# Patient Record
Sex: FEMALE | Race: WHITE | Employment: FULL TIME | URBAN - METROPOLITAN AREA
[De-identification: names, ages, dates, MRNs, and addresses within clinical notes are randomized per-mention and may not be internally consistent; named-entity substitution may affect disease eponyms.]

---

## 2019-04-04 RX ORDER — IBUPROFEN 200 MG
200 TABLET ORAL
COMMUNITY

## 2019-04-04 NOTE — PERIOP NOTES
[facility] Ambulatory Surgery Unit Pre-operative Instructions Surgery/Procedure Date  4/9/19            Tentative Arrival Time 3131 1. On the day of your surgery/procedure, please report to the Ambulatory Surgery Unit Registration Desk and sign in at your designated time. The Ambulatory Surgery Unit is located in [facility] on the [facility] side of the [facility] across from the [address] [place] [address]. Please have all of your health insurance cards and a photo ID. 2. You must have someone with you to drive you home, as you should not drive a car for 24 hours following anesthesia. Please make arrangements for a responsible adult friend or family member to stay with you for at least the first 24 hours after your surgery. 3. Do not have anything to eat or drink (including water, gum, mints, coffee, juice) after 11:59 PM  4/8/19, Monday. This may not apply to medications prescribed by your physician. (Please note below the special instructions with medications to take the morning of surgery, if applicable.) 4. We recommend you do not drink any alcoholic beverages for 24 hours before and after your surgery. 5. Contact your surgeons office for instructions on the following medications: non-steroidal anti-inflammatory drugs (i.e. Advil, Aleve), vitamins, and supplements. (Some surgeons will want you to stop these medications prior to surgery and others may allow you to take them) **If you are currently taking Plavix, Coumadin, Aspirin and/or other blood-thinning agents, contact your surgeon for instructions. ** Your surgeon will partner with the physician prescribing these medications to determine if it is safe to stop or if you need to continue taking. Please do not stop taking these medications without instructions from your surgeon.  
 
6. In an effort to help prevent surgical site infection, we ask that you shower with an anti-bacterial soap (i.e. Dial/Safeguard, or the soap provided to you at your preadmission testing appointment) for 3 days prior to and on the morning of surgery, using a fresh towel after each shower. (Please begin this process with fresh bed linens.) Do not apply any lotions, powders, or deodorants after the shower on the day of your procedure. If applicable, please do not shave the operative site for 48 hours prior to surgery. 7. Wear comfortable clothes. Wear glasses instead of contacts. Do not bring any jewelry or money (other than copays or fees as instructed). Do not wear make-up, particularly mascara, the morning of your surgery. Do not wear nail polish, particularly if you are having foot /hand surgery. Wear your hair loose or down, no ponytails, buns, sharif pins or clips. All body piercings must be removed. 8. You should understand that if you do not follow these instructions your surgery may be cancelled. If your physical condition changes (i.e. fever, cold or flu) please contact your surgeon as soon as possible. 9. It is important that you be on time. If a situation occurs where you may be late, or if you have any questions or problems, please call [phone]. 
 
10. Your surgery time may be subject to change. You will receive a phone call the day prior to surgery to confirm your arrival time. 11. Pediatric patients: please bring a change of clothes, diapers, bottle/sippy cup, pacifier, etc. 
 
 
Special Instructions: Take all medications and inhalers, as prescribed, on the morning of surgery with a sip of water EXCEPT: None Insulin Dependent Diabetic patients: Take your diabetic medications as prescribed the day before surgery. Hold all diabetic medications the day of surgery. If you are scheduled to arrive for surgery after 8:00 AM, and your AM blood sugar is >200, please call Ambulatory Surgery. I understand a pre-operative phone call will be made to verify my surgery time. In the event that I am not available, I give permission for a message to be left on my answering service and/or with another person? Yes Pre-op instructions given to pt over the phone and she verbalized an understanding 
 
 ___________________      ___________________      ________________ 
(Signature of Patient)          (Witness)                   (Date and Time)

## 2019-04-04 NOTE — PERIOP NOTES
Late entry Called pt to do PAT phone call. Pt stated she could not get into her PCP until May 9. Told pt that Dr. Azeem Yen office will be notified for clarification and pt will be called back. Spoke to [facility] from Dr. Azeem Yen office for clarification. [facility] stated that Dr. Payne Notice may not feel comfortable proceeding with surgery until she has seen PCP. [facility] stated pt sees Dr. Azeem Yen tomorrow and MD may be able to clear pt for surgery but no guarantee. [address] to [facility] from Dr. Azeem Yen office who stated Dr. Payne Notice just called her to tell her that she will try to do a physical in the office tomorrow for pt's scheduled appointment. There will be a chance pt will have to see PCP before cataract surgery. [facility] will call pt and notify her.

## 2019-04-08 ENCOUNTER — ANESTHESIA EVENT (OUTPATIENT)
Dept: SURGERY | Age: 54
End: 2019-04-08
Payer: COMMERCIAL

## 2019-04-08 NOTE — PERIOP NOTES
Clarified with Dr. Colby Gonzalez office that patient will have cataract surgery tomorrow.  Pt will proceed with surgery per MD.

## 2019-04-09 ENCOUNTER — HOSPITAL ENCOUNTER (OUTPATIENT)
Age: 54
Setting detail: OUTPATIENT SURGERY
Discharge: HOME | End: 2019-04-09
Attending: OPHTHALMOLOGY | Admitting: OPHTHALMOLOGY
Payer: COMMERCIAL

## 2019-04-09 ENCOUNTER — ANESTHESIA (OUTPATIENT)
Dept: SURGERY | Age: 54
End: 2019-04-09
Payer: COMMERCIAL

## 2019-04-09 VITALS
TEMPERATURE: 97.6 F | RESPIRATION RATE: 20 BRPM | HEIGHT: 68 IN | BODY MASS INDEX: 18.94 KG/M2 | DIASTOLIC BLOOD PRESSURE: 95 MMHG | HEART RATE: 63 BPM | SYSTOLIC BLOOD PRESSURE: 119 MMHG | WEIGHT: 125 LBS | OXYGEN SATURATION: 94 %

## 2019-04-09 PROBLEM — H25.812 COMBINED FORM OF AGE-RELATED CATARACT, LEFT EYE: Status: ACTIVE | Noted: 2019-04-09

## 2019-04-09 PROBLEM — H25.812 COMBINED FORM OF AGE-RELATED CATARACT, LEFT EYE: Status: RESOLVED | Noted: 2019-04-09 | Resolved: 2019-04-09

## 2019-04-09 PROCEDURE — 77030029561: Performed by: OPHTHALMOLOGY

## 2019-04-09 PROCEDURE — 76210000040 HC AMBSU PH I REC FIRST 0.5 HR: Performed by: OPHTHALMOLOGY

## 2019-04-09 PROCEDURE — 76030000000 HC AMB SURG OR TIME 0.5 TO 1: Performed by: OPHTHALMOLOGY

## 2019-04-09 PROCEDURE — 76210000046 HC AMBSU PH II REC FIRST 0.5 HR: Performed by: OPHTHALMOLOGY

## 2019-04-09 PROCEDURE — 74011250636 HC RX REV CODE- 250/636

## 2019-04-09 PROCEDURE — V2632 POST CHMBR INTRAOCULAR LENS: HCPCS | Performed by: OPHTHALMOLOGY

## 2019-04-09 PROCEDURE — 74011000250 HC RX REV CODE- 250: Performed by: OPHTHALMOLOGY

## 2019-04-09 PROCEDURE — 77030018846 HC SOL IRR STRL H20 ICUM -A: Performed by: OPHTHALMOLOGY

## 2019-04-09 PROCEDURE — 74011000250 HC RX REV CODE- 250

## 2019-04-09 PROCEDURE — 76060000061 HC AMB SURG ANES 0.5 TO 1 HR: Performed by: OPHTHALMOLOGY

## 2019-04-09 PROCEDURE — 74011250636 HC RX REV CODE- 250/636: Performed by: OPHTHALMOLOGY

## 2019-04-09 DEVICE — LENS IO +260 DIOPT L13MM DIA6MM 0DEG HAPTIC ANG A CONSTANT: Type: IMPLANTABLE DEVICE | Site: EYE | Status: FUNCTIONAL

## 2019-04-09 RX ORDER — SODIUM CHLORIDE 9 MG/ML
INJECTION, SOLUTION INTRAVENOUS
Status: DISCONTINUED | OUTPATIENT
Start: 2019-04-09 | End: 2019-04-09 | Stop reason: HOSPADM

## 2019-04-09 RX ORDER — PHENYLEPHRINE HYDROCHLORIDE 25 MG/ML
SOLUTION/ DROPS OPHTHALMIC
Status: COMPLETED
Start: 2019-04-09 | End: 2019-04-09

## 2019-04-09 RX ORDER — PROPARACAINE HYDROCHLORIDE 5 MG/ML
1 SOLUTION/ DROPS OPHTHALMIC
Status: DISCONTINUED | OUTPATIENT
Start: 2019-04-09 | End: 2019-04-09 | Stop reason: HOSPADM

## 2019-04-09 RX ORDER — LIDOCAINE HYDROCHLORIDE 10 MG/ML
0.1 INJECTION, SOLUTION EPIDURAL; INFILTRATION; INTRACAUDAL; PERINEURAL AS NEEDED
Status: DISCONTINUED | OUTPATIENT
Start: 2019-04-09 | End: 2019-04-09 | Stop reason: HOSPADM

## 2019-04-09 RX ORDER — SODIUM CHLORIDE 0.9 % (FLUSH) 0.9 %
5-40 SYRINGE (ML) INJECTION AS NEEDED
Status: DISCONTINUED | OUTPATIENT
Start: 2019-04-09 | End: 2019-04-09 | Stop reason: HOSPADM

## 2019-04-09 RX ORDER — SODIUM CHLORIDE, SODIUM LACTATE, POTASSIUM CHLORIDE, CALCIUM CHLORIDE 600; 310; 30; 20 MG/100ML; MG/100ML; MG/100ML; MG/100ML
25 INJECTION, SOLUTION INTRAVENOUS CONTINUOUS
Status: DISCONTINUED | OUTPATIENT
Start: 2019-04-09 | End: 2019-04-09 | Stop reason: HOSPADM

## 2019-04-09 RX ORDER — SODIUM CHLORIDE 0.9 % (FLUSH) 0.9 %
5-40 SYRINGE (ML) INJECTION EVERY 8 HOURS
Status: DISCONTINUED | OUTPATIENT
Start: 2019-04-09 | End: 2019-04-09 | Stop reason: HOSPADM

## 2019-04-09 RX ORDER — FENTANYL CITRATE 50 UG/ML
25 INJECTION, SOLUTION INTRAMUSCULAR; INTRAVENOUS
Status: DISCONTINUED | OUTPATIENT
Start: 2019-04-09 | End: 2019-04-09 | Stop reason: HOSPADM

## 2019-04-09 RX ORDER — PREDNISOLONE ACETATE 10 MG/ML
SUSPENSION/ DROPS OPHTHALMIC
Status: DISCONTINUED
Start: 2019-04-09 | End: 2019-04-09 | Stop reason: HOSPADM

## 2019-04-09 RX ORDER — SODIUM CHLORIDE 9 MG/ML
25 INJECTION, SOLUTION INTRAVENOUS ONCE
Status: COMPLETED | OUTPATIENT
Start: 2019-04-09 | End: 2019-04-09

## 2019-04-09 RX ORDER — PHENYLEPHRINE HYDROCHLORIDE 25 MG/ML
1 SOLUTION/ DROPS OPHTHALMIC
Status: COMPLETED | OUTPATIENT
Start: 2019-04-09 | End: 2019-04-09

## 2019-04-09 RX ORDER — MIDAZOLAM HYDROCHLORIDE 1 MG/ML
INJECTION, SOLUTION INTRAMUSCULAR; INTRAVENOUS AS NEEDED
Status: DISCONTINUED | OUTPATIENT
Start: 2019-04-09 | End: 2019-04-09 | Stop reason: HOSPADM

## 2019-04-09 RX ORDER — CYCLOPENTOLATE HYDROCHLORIDE 20 MG/ML
1 SOLUTION/ DROPS OPHTHALMIC
Status: COMPLETED | OUTPATIENT
Start: 2019-04-09 | End: 2019-04-09

## 2019-04-09 RX ORDER — LIDOCAINE HYDROCHLORIDE 40 MG/ML
INJECTION, SOLUTION RETROBULBAR; TOPICAL AS NEEDED
Status: DISCONTINUED | OUTPATIENT
Start: 2019-04-09 | End: 2019-04-09 | Stop reason: HOSPADM

## 2019-04-09 RX ORDER — ONDANSETRON 2 MG/ML
4 INJECTION INTRAMUSCULAR; INTRAVENOUS AS NEEDED
Status: DISCONTINUED | OUTPATIENT
Start: 2019-04-09 | End: 2019-04-09 | Stop reason: HOSPADM

## 2019-04-09 RX ORDER — DIPHENHYDRAMINE HYDROCHLORIDE 50 MG/ML
12.5 INJECTION, SOLUTION INTRAMUSCULAR; INTRAVENOUS AS NEEDED
Status: DISCONTINUED | OUTPATIENT
Start: 2019-04-09 | End: 2019-04-09 | Stop reason: HOSPADM

## 2019-04-09 RX ORDER — LIDOCAINE HYDROCHLORIDE 10 MG/ML
INJECTION, SOLUTION EPIDURAL; INFILTRATION; INTRACAUDAL; PERINEURAL AS NEEDED
Status: DISCONTINUED | OUTPATIENT
Start: 2019-04-09 | End: 2019-04-09 | Stop reason: HOSPADM

## 2019-04-09 RX ORDER — TROPICAMIDE 10 MG/ML
1 SOLUTION/ DROPS OPHTHALMIC
Status: COMPLETED | OUTPATIENT
Start: 2019-04-09 | End: 2019-04-09

## 2019-04-09 RX ORDER — PREDNISOLONE ACETATE 10 MG/ML
SUSPENSION/ DROPS OPHTHALMIC AS NEEDED
Status: DISCONTINUED | OUTPATIENT
Start: 2019-04-09 | End: 2019-04-09 | Stop reason: HOSPADM

## 2019-04-09 RX ADMIN — PHENYLEPHRINE HYDROCHLORIDE 1 DROP: 25 SOLUTION/ DROPS OPHTHALMIC at 07:31

## 2019-04-09 RX ADMIN — MIDAZOLAM HYDROCHLORIDE 0.5 MG: 1 INJECTION, SOLUTION INTRAMUSCULAR; INTRAVENOUS at 08:25

## 2019-04-09 RX ADMIN — SODIUM CHLORIDE: 9 INJECTION, SOLUTION INTRAVENOUS at 07:08

## 2019-04-09 RX ADMIN — TROPICAMIDE 1 DROP: 10 SOLUTION/ DROPS OPHTHALMIC at 07:24

## 2019-04-09 RX ADMIN — MIDAZOLAM HYDROCHLORIDE 0.5 MG: 1 INJECTION, SOLUTION INTRAMUSCULAR; INTRAVENOUS at 08:05

## 2019-04-09 RX ADMIN — CYCLOPENTOLATE HYDROCHLORIDE 1 DROP: 20 SOLUTION/ DROPS OPHTHALMIC at 07:18

## 2019-04-09 RX ADMIN — PHENYLEPHRINE HYDROCHLORIDE 1 DROP: 25 SOLUTION/ DROPS OPHTHALMIC at 07:19

## 2019-04-09 RX ADMIN — PHENYLEPHRINE HYDROCHLORIDE 1 DROP: 25 SOLUTION/ DROPS OPHTHALMIC at 07:24

## 2019-04-09 RX ADMIN — SODIUM CHLORIDE 25 ML/HR: 900 INJECTION, SOLUTION INTRAVENOUS at 07:08

## 2019-04-09 RX ADMIN — PHENYLEPHRINE HYDROCHLORIDE 1 DROP: 25 SOLUTION/ DROPS OPHTHALMIC at 07:14

## 2019-04-09 RX ADMIN — CYCLOPENTOLATE HYDROCHLORIDE 1 DROP: 20 SOLUTION/ DROPS OPHTHALMIC at 07:14

## 2019-04-09 RX ADMIN — TROPICAMIDE 1 DROP: 10 SOLUTION/ DROPS OPHTHALMIC at 07:31

## 2019-04-09 RX ADMIN — MIDAZOLAM HYDROCHLORIDE 1 MG: 1 INJECTION, SOLUTION INTRAMUSCULAR; INTRAVENOUS at 07:57

## 2019-04-09 RX ADMIN — CYCLOPENTOLATE HYDROCHLORIDE 1 DROP: 20 SOLUTION/ DROPS OPHTHALMIC at 07:24

## 2019-04-09 RX ADMIN — TROPICAMIDE 1 DROP: 10 SOLUTION/ DROPS OPHTHALMIC at 07:19

## 2019-04-09 RX ADMIN — TROPICAMIDE 1 DROP: 10 SOLUTION/ DROPS OPHTHALMIC at 07:14

## 2019-04-09 RX ADMIN — PROPARACAINE HYDROCHLORIDE 1 DROP: 5 SOLUTION/ DROPS OPHTHALMIC at 07:14

## 2019-04-09 RX ADMIN — CYCLOPENTOLATE HYDROCHLORIDE 1 DROP: 20 SOLUTION/ DROPS OPHTHALMIC at 07:31

## 2019-04-09 NOTE — PERIOP NOTES
Permission received to review discharge instructions and discuss private health information with Ingrid Taylor, .

## 2019-04-09 NOTE — DISCHARGE INSTRUCTIONS
CATARACT POST-OP INSTRUCTIONS    DAY OF SURGERY:  Remove patch  2 hours after you get home & start drops      1. Antibiotic - TAN TOP (Ofloxacin)                  1 drop in operated eye every 2 hours while awake                    2. Steroid - PINK OR WHITE TOP (Prednisolone acetate)                  SHAKE WELL BEFORE USING (10-15X)                  1 drop in operated eye every 2 hours while awake.                                                                        ________________________________________________________________________    STARTING ON THE DAY AFTER SURGERY:     1. Antibiotic - TAN TOP (Ofloxacin)                 1 drop in operated eye 4x per day for 1 week then STOP                   2. Steroid - PINK OR WHITE TOP (Prednisolone acetate)                  SHAKE WELL BEFORE USING (10-15X)                  1 drop in operated eye every 2 hours while awake x  1  week, then                  1 drop in operated eye 4x per day x  1  week then,                  1 drop in operated eye 3x per day x  1  week then,                  1 drop in operated eye 2x per day x  1  week then,                  1 drop in operated eye 1x per day x  1  week then,                                                              SPACE OUT ALL EYE DROPS BY 5 MINUTES SO YOU DON'T [address] OUT                ONE MEDICATION WITH THE OTHER ONE.      EYE CARE:    1.  DO NOT RUB YOUR EYE!  2.  Wear eye shield when sleeping for 1st week after surgery  3. No heavy lifting (over 15 pounds) or bending below the waist or straining (hard coughing/sneezing/bowel movements - take stool softener if needed)  for 5 days after surgery. 4.  Do not get dirty water in your surgery eye. You can shower if you cover and close your surgery eye.  5.  No makeup for 5 days after surgery on or around the surgery eye. No gardening or swimming for 3 weeks. Wear safety glasses whenever dirt may get in your eyes.   6.  Use all other eye medications you were on before surgery unless instructed by your eye doctor not to do so.  7.  Wash your hands before touching your eye or your eye drops. Do not contaminate the tips of the bottles. 8.  Call immediately for severe pain, worse redness, decreased vision, new flashing lights or floaters. A scratchy sensation is normal and should get better as your eye heals. 9.  Please call with any questions about your postop care. You can reach Dr. Jag Haas at phone number:  [phone] (office) or [phone] (cell) in an emergency. DO NOT TAKE SLEEPING MEDICATIONS OR ANTIANXIETY MEDICATIONS WHILE TAKING NARCOTIC PAIN MEDICATIONS,  ESPECIALLY THE NIGHT OF ANESTHESIA. CPAP PATIENTS BE SURE TO WEAR MACHINE WHENEVER NAPPING OR SLEEPING. DISCHARGE SUMMARY from Nurse    The following personal items collected during your admission are returned to you:   Dental Appliance: Dental Appliances: None  Vision: Visual Aid: Glasses, At home  Hearing Aid:    Jewelry: Jewelry: None  Clothing: Clothing: With patient  Other Valuables: Other Valuables: None  Valuables sent to safe:        PATIENT INSTRUCTIONS:    After general anesthesia or intravenous sedation, for 24 hours or while taking prescription Narcotics:  · Someone should be with you for the next 24 hours. ·  For your own safety, a responsible adult must drive you home. · Limit your activities  · Recommended activity: Rest today, up with assistance today. Do not climb stairs or shower unattended for the next 24 hours. · Please start with a soft bland diet and advance as tolerated (no nausea) to regular diet. · Do not drive and operate hazardous machinery  · Do not make important personal or business decisions  · Do  not drink alcoholic beverages  · If you have not urinated within 8 hours after discharge, please contact your surgeon on call.     Report the following to your surgeon:  · Excessive pain, swelling, redness or odor of or around the surgical area  · Temperature over 100.5  · Nausea and vomiting lasting longer than 4 hours or if unable to take medications  · Any signs of decreased circulation or nerve impairment to extremity: change in color, persistent  numbness, tingling, coldness or increase pain  ·   ·   · You will receive a Post Operative Call from one of the Recovery Room Nurses on the day after your surgery to check on you. It is very important for us to know how you are recovering after your surgery. If you have an issue or need to speak with someone, please call your surgeon, do not wait for the post operative call. · You may receive an e-mail or letter in the mail from [employer] regarding your experience with us in the Ambulatory Surgery Unit. Your feedback is valuable to us and we appreciate your participation in the survey. ·   .  · If the above instructions are not adequate or you are having problems after your surgery, call the physician at his office number. ·   · We wish youre a speedy recovery ? What to do at Home:      *  Please give a list of your current medications to your Primary Care Provider. *  Please update this list whenever your medications are discontinued, doses are      changed, or new medications (including over-the-counter products) are added. *  Please carry medication information at all times in case of emergency situations. These are general instructions for a healthy lifestyle:    No smoking/ No tobacco products/ Avoid exposure to second hand smoke    Surgeon General's Warning:  Quitting smoking now greatly reduces serious risk to your health.     Obesity, smoking, and sedentary lifestyle greatly increases your risk for illness    A healthy diet, regular physical exercise & weight monitoring are important for maintaining a healthy lifestyle    You may be retaining fluid if you have a history of heart failure or if you experience any of the following symptoms:  Weight gain of 3 pounds or more overnight or 5 pounds in a week, increased swelling in our hands or feet or shortness of breath while lying flat in bed. Please call your doctor as soon as you notice any of these symptoms; do not wait until your next office visit. Recognize signs and symptoms of STROKE:    B - Balance  E - Eyes    F-face looks uneven    A-arms unable to move or move even    S-speech slurred or non-existent    T-time-call 911 as soon as signs and symptoms begin-DO NOT go       Back to bed or wait to see if you get better-TIME IS BRAIN. If you have not received your influenza and/or pneumococcal vaccine, please follow up with your primary care physician. The discharge information has been reviewed with the patient and caregiver. The patient and caregiver verbalized understanding. TO PREVENT AN INFECTION      1. [address] YOUR HANDS     To prevent infection, good handwashing is the most important thing you or your caregiver can do.  Wash your hands with soap and water or use the hand  we gave you before you touch any wounds. 2.  USE CLEAN SHEETS     Use freshly cleaned sheets on your bed after surgery.  To keep the surgery site clean, do not allow pets to sleep with you while your wound is still healing. 3. STOP SMOKING    Stop smoking, or at least cut back on smoking     Smoking slows your healing. 4.  CONTROL YOUR BLOOD SUGAR     High blood sugars slow wound healing.  If you are diabetic, control your blood sugar levels before and after your surgery.

## 2019-04-09 NOTE — ANESTHESIA PREPROCEDURE EVALUATION
Relevant Problems No relevant active problems Anesthetic History No history of anesthetic complications Review of Systems / Medical History Patient summary reviewed, nursing notes reviewed and pertinent labs reviewed Pulmonary Smoker (former, quit 07/18) Neuro/Psych Within defined limits Cardiovascular Within defined limits Exercise tolerance: >4 METS 
  
GI/Hepatic/Renal 
Within defined limits Endo/Other Within defined limits Other Findings Physical Exam 
 
Airway Mallampati: III 
TM Distance: < 4 cm Neck ROM: normal range of motion Mouth opening: Normal 
 
 Cardiovascular Rhythm: regular Rate: normal 
 
 
Pertinent negatives: No murmur Dental 
No notable dental hx Pulmonary Breath sounds clear to auscultation Abdominal 
GI exam deferred Other Findings Anesthetic Plan ASA: 2 Anesthesia type: MAC Induction: Intravenous Anesthetic plan and risks discussed with: Patient

## 2019-04-09 NOTE — ANESTHESIA POSTPROCEDURE EVALUATION
Procedure(s): CATARACT EXTRACTION WITH INTRA OCULAR LENS IMPLANT, LEFT EYE. MAC Anesthesia Post Evaluation Multimodal analgesia: multimodal analgesia not used between 6 hours prior to anesthesia start to PACU discharge Patient location during evaluation: bedside Patient participation: complete - patient participated Level of consciousness: awake and alert Pain score: 0 Airway patency: patent Anesthetic complications: no 
Cardiovascular status: acceptable Respiratory status: acceptable Hydration status: acceptable Post anesthesia nausea and vomiting:  none Vitals Value Taken Time /73 4/9/2019  8:39 AM  
Temp 36.4 °C (97.6 °F) 4/9/2019  8:39 AM  
Pulse 66 4/9/2019  8:39 AM  
Resp 24 4/9/2019  8:39 AM  
SpO2 95 % 4/9/2019  8:39 AM

## 2019-04-09 NOTE — OP NOTES
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 Postop Diagnosis: same     Procedure/Surgery: Phacoemulsification with intraocular lens placement Left eye. Surgeon(s) and Role:     * Deepali Flood MD - Primary    Anesthesia: MAC     Blood Loss: None    Complications: None    Speciman Removed: * No specimens in log *     PHACO TIME:  00:35.8 seconds at an average power of 7.8%. CDE 3.47    Implants:   Implant Name Type Inv. Item Serial No.  Lot No. LRB No. Used Action   LENS IOL POST 1-PC 6X13 26.0 -- [employer] - [id number]  LENS IOL POST 1-PC 6X13 26.0 -- [employer] [id number] [employer] NA Left 1 Implanted       Procedure: The patient was consented. The operative eye was confirmed and marked in the preoperative holding area. Dilation was confirmed and the patient was brought into the operating room. The eye was prepped and draped in the typical aseptic fashion. Lid speculum was placed. Paracentesis was made and preservative-free lidocaine was injected into the anterior chamber followed by viscoelastic (Viscoat) to fill the anterior chamber. A triplanar self-sealing temporal clear cornea incision was made with a 2.4 mm keratome on the steep axis 180 degrees. A continuous curvilinear capsulorrhexis was initiated with a cystotome and completed with Utrata forceps. Hydrodissection and hydrodelination were performed with confirmed free rotation of the nucleus. Phacoemulsification was peformed in a stop-and-chop fashion followed by cortical removal with irrigation/aspiration as well as bimanual irrigation/aspiration thru an additional superonasal paracentesis. Capsular polish was performed. The capsular bag was reinflated with viscoelastic (Provisc). The lens type and power was confirmed and the lens injected into the capsular bag. Residual viscoelastic was removed and the eye was formed with BSS.   Stromal hydration was performed. The wound and paracentesis were watertight at the end of the case. The lens was well centered and the pupil was round at the end of the case. The lid speculum was removed. Postoperative eyedrops of ofloxacin and Pred Forte were instilled in the eye. An eye shield was placed over the eye. The patient tolerated the surgery well and was taken to the postoperative holding area in a stable condition.     Saurabh Ahn MD    4/9/2019  8:40 AM

## 2019-04-09 NOTE — PERIOP NOTES
Discharge instructions reviewed w/pt. And . They verbalized understanding. Vss. Eye shield to left eye intact. Pt. Discharged to car via wheelchair.

## 2019-04-09 NOTE — PERIOP NOTES
Guido Osullivan 1965 
[id number] Situation: 
Verbal report given from: Favian Rosenberg CRNA Procedure: Procedure(s): CATARACT EXTRACTION WITH INTRA OCULAR LENS IMPLANT, LEFT EYE Background: 
 
Preoperative diagnosis: CATARACT LEFT EYE Postoperative diagnosis: CATARACT LEFT EYE :  Dr. Nish Barr Assistant(s): Circ-1: Laura Guidry RN Scrub Tech-1: Dalton Levine Specimens: * No specimens in log * Assessment: 
Intra-procedure medications Anesthesia gave intra-procedure sedation and medications, see anesthesia flow sheet Intravenous fluids: Berneta Bene Vital signs stable Recommendation: 
 
Permission to share finding with  Jozef Raymond : yes

## 2019-04-17 NOTE — PERIOP NOTES
[facility] Ambulatory Surgery Unit Pre-operative Instructions Surgery/Procedure Date  4/23/19           Tentative Arrival Time 0592 1. On the day of your surgery/procedure, please report to the Ambulatory Surgery Unit Registration Desk and sign in at your designated time. The Ambulatory Surgery Unit is located in [facility] on the [facility] side of the [facility] across from the [address] [place] [address]. Please have all of your health insurance cards and a photo ID. 2. You must have someone with you to drive you home, as you should not drive a car for 24 hours following anesthesia. Please make arrangements for a responsible adult friend or family member to stay with you for at least the first 24 hours after your surgery. 3. Do not have anything to eat or drink (including water, gum, mints, coffee, juice) after 11:59 PM  4/22/19. This may not apply to medications prescribed by your physician. (Please note below the special instructions with medications to take the morning of surgery, if applicable.) 4. We recommend you do not drink any alcoholic beverages for 24 hours before and after your surgery. 5. Contact your surgeons office for instructions on the following medications: non-steroidal anti-inflammatory drugs (i.e. Advil, Aleve), vitamins, and supplements. (Some surgeons will want you to stop these medications prior to surgery and others may allow you to take them) **If you are currently taking Plavix, Coumadin, Aspirin and/or other blood-thinning agents, contact your surgeon for instructions. ** Your surgeon will partner with the physician prescribing these medications to determine if it is safe to stop or if you need to continue taking. Please do not stop taking these medications without instructions from your surgeon.  
 
6. In an effort to help prevent surgical site infection, we ask that you shower with an anti-bacterial soap (i.e. Dial/Safeguard, or the soap provided to you at your preadmission testing appointment) on the morning of surgery, using a fresh towel after each shower. (Please begin this process with fresh bed linens.) Do not apply any lotions, powders, or deodorants after the shower on the day of your procedure. If applicable, please do not shave the operative site for 48 hours prior to surgery. 7. Wear comfortable clothes. Wear glasses instead of contacts. Do not bring any jewelry or money (other than copays or fees as instructed). Do not wear make-up, particularly mascara, the morning of your surgery. Do not wear nail polish, particularly if you are having foot /hand surgery. Wear your hair loose or down, no ponytails, buns, sharif pins or clips. All body piercings must be removed. 8. You should understand that if you do not follow these instructions your surgery may be cancelled. If your physical condition changes (i.e. fever, cold or flu) please contact your surgeon as soon as possible. 9. It is important that you be on time. If a situation occurs where you may be late, or if you have any questions or problems, please call [phone]. 
 
10. Your surgery time may be subject to change. You will receive a phone call the day prior to surgery to confirm your arrival time. 11. Pediatric patients: please bring a change of clothes, diapers, bottle/sippy cup, pacifier, etc. 
 
 
Special Instructions: Take all medications and inhalers, as prescribed, on the morning of surgery with a sip of water EXCEPT: none Insulin Dependent Diabetic patients: Take your diabetic medications as prescribed the day before surgery. Hold all diabetic medications the day of surgery. If you are scheduled to arrive for surgery after 8:00 AM, and your AM blood sugar is >200, please call Ambulatory Surgery. I understand a pre-operative phone call will be made to verify my surgery time.   In the event that I am not available, I give permission for a message to be left on my answering service and/or with another person? YES The above note was reviewed with patient during PAT phone call on 4/17/19, patient verbalized understanding.  
 
 
 
 
 ___________________      ___________________      ________________ 
(Signature of Patient)          (Witness)                   (Date and Time)

## 2019-04-22 ENCOUNTER — ANESTHESIA EVENT (OUTPATIENT)
Dept: SURGERY | Age: 54
End: 2019-04-22
Payer: COMMERCIAL

## 2019-04-23 ENCOUNTER — HOSPITAL ENCOUNTER (OUTPATIENT)
Age: 54
Setting detail: OUTPATIENT SURGERY
Discharge: HOME | End: 2019-04-23
Attending: OPHTHALMOLOGY | Admitting: OPHTHALMOLOGY
Payer: COMMERCIAL

## 2019-04-23 ENCOUNTER — ANESTHESIA (OUTPATIENT)
Dept: SURGERY | Age: 54
End: 2019-04-23
Payer: COMMERCIAL

## 2019-04-23 VITALS
WEIGHT: 124.6 LBS | DIASTOLIC BLOOD PRESSURE: 77 MMHG | RESPIRATION RATE: 17 BRPM | OXYGEN SATURATION: 98 % | HEART RATE: 65 BPM | HEIGHT: 68 IN | SYSTOLIC BLOOD PRESSURE: 118 MMHG | TEMPERATURE: 97.6 F | BODY MASS INDEX: 18.88 KG/M2

## 2019-04-23 PROBLEM — H25.811 COMBINED FORM OF AGE-RELATED CATARACT, RIGHT EYE: Status: ACTIVE | Noted: 2019-04-23

## 2019-04-23 PROCEDURE — 74011000250 HC RX REV CODE- 250

## 2019-04-23 PROCEDURE — 77030018846 HC SOL IRR STRL H20 ICUM -A: Performed by: OPHTHALMOLOGY

## 2019-04-23 PROCEDURE — 76060000061 HC AMB SURG ANES 0.5 TO 1 HR: Performed by: OPHTHALMOLOGY

## 2019-04-23 PROCEDURE — 76210000046 HC AMBSU PH II REC FIRST 0.5 HR: Performed by: OPHTHALMOLOGY

## 2019-04-23 PROCEDURE — 74011250636 HC RX REV CODE- 250/636: Performed by: OPHTHALMOLOGY

## 2019-04-23 PROCEDURE — 74011000250 HC RX REV CODE- 250: Performed by: OPHTHALMOLOGY

## 2019-04-23 PROCEDURE — 76030000000 HC AMB SURG OR TIME 0.5 TO 1: Performed by: OPHTHALMOLOGY

## 2019-04-23 PROCEDURE — 77030029561: Performed by: OPHTHALMOLOGY

## 2019-04-23 PROCEDURE — 77030021352 HC CBL LD SYS DISP COVD -B: Performed by: OPHTHALMOLOGY

## 2019-04-23 PROCEDURE — V2632 POST CHMBR INTRAOCULAR LENS: HCPCS | Performed by: OPHTHALMOLOGY

## 2019-04-23 PROCEDURE — 74011250636 HC RX REV CODE- 250/636

## 2019-04-23 DEVICE — IMPLANTABLE DEVICE: Type: IMPLANTABLE DEVICE | Site: EYE | Status: FUNCTIONAL

## 2019-04-23 RX ORDER — TROPICAMIDE 10 MG/ML
1 SOLUTION/ DROPS OPHTHALMIC
Status: COMPLETED | OUTPATIENT
Start: 2019-04-23 | End: 2019-04-23

## 2019-04-23 RX ORDER — LIDOCAINE HYDROCHLORIDE 40 MG/ML
INJECTION, SOLUTION RETROBULBAR; TOPICAL AS NEEDED
Status: DISCONTINUED | OUTPATIENT
Start: 2019-04-23 | End: 2019-04-23 | Stop reason: HOSPADM

## 2019-04-23 RX ORDER — DIPHENHYDRAMINE HYDROCHLORIDE 50 MG/ML
12.5 INJECTION, SOLUTION INTRAMUSCULAR; INTRAVENOUS AS NEEDED
Status: DISCONTINUED | OUTPATIENT
Start: 2019-04-23 | End: 2019-04-23 | Stop reason: HOSPADM

## 2019-04-23 RX ORDER — SODIUM CHLORIDE 0.9 % (FLUSH) 0.9 %
5-40 SYRINGE (ML) INJECTION EVERY 8 HOURS
Status: DISCONTINUED | OUTPATIENT
Start: 2019-04-23 | End: 2019-04-23 | Stop reason: HOSPADM

## 2019-04-23 RX ORDER — OFLOXACIN 3 MG/ML
SOLUTION/ DROPS OPHTHALMIC AS NEEDED
Status: DISCONTINUED | OUTPATIENT
Start: 2019-04-23 | End: 2019-04-23 | Stop reason: HOSPADM

## 2019-04-23 RX ORDER — SODIUM CHLORIDE 9 MG/ML
INJECTION, SOLUTION INTRAVENOUS
Status: DISCONTINUED | OUTPATIENT
Start: 2019-04-23 | End: 2019-04-23 | Stop reason: HOSPADM

## 2019-04-23 RX ORDER — SODIUM CHLORIDE, SODIUM LACTATE, POTASSIUM CHLORIDE, CALCIUM CHLORIDE 600; 310; 30; 20 MG/100ML; MG/100ML; MG/100ML; MG/100ML
25 INJECTION, SOLUTION INTRAVENOUS CONTINUOUS
Status: DISCONTINUED | OUTPATIENT
Start: 2019-04-23 | End: 2019-04-23 | Stop reason: HOSPADM

## 2019-04-23 RX ORDER — ONDANSETRON 2 MG/ML
4 INJECTION INTRAMUSCULAR; INTRAVENOUS AS NEEDED
Status: DISCONTINUED | OUTPATIENT
Start: 2019-04-23 | End: 2019-04-23 | Stop reason: HOSPADM

## 2019-04-23 RX ORDER — CYCLOPENTOLATE HYDROCHLORIDE 20 MG/ML
1 SOLUTION/ DROPS OPHTHALMIC
Status: COMPLETED | OUTPATIENT
Start: 2019-04-23 | End: 2019-04-23

## 2019-04-23 RX ORDER — SODIUM CHLORIDE 0.9 % (FLUSH) 0.9 %
5-40 SYRINGE (ML) INJECTION AS NEEDED
Status: DISCONTINUED | OUTPATIENT
Start: 2019-04-23 | End: 2019-04-23 | Stop reason: HOSPADM

## 2019-04-23 RX ORDER — MIDAZOLAM HYDROCHLORIDE 1 MG/ML
INJECTION, SOLUTION INTRAMUSCULAR; INTRAVENOUS AS NEEDED
Status: DISCONTINUED | OUTPATIENT
Start: 2019-04-23 | End: 2019-04-23 | Stop reason: HOSPADM

## 2019-04-23 RX ORDER — PROPARACAINE HYDROCHLORIDE 5 MG/ML
1 SOLUTION/ DROPS OPHTHALMIC
Status: COMPLETED | OUTPATIENT
Start: 2019-04-23 | End: 2019-04-23

## 2019-04-23 RX ORDER — LIDOCAINE HYDROCHLORIDE 10 MG/ML
INJECTION, SOLUTION EPIDURAL; INFILTRATION; INTRACAUDAL; PERINEURAL AS NEEDED
Status: DISCONTINUED | OUTPATIENT
Start: 2019-04-23 | End: 2019-04-23 | Stop reason: HOSPADM

## 2019-04-23 RX ORDER — FENTANYL CITRATE 50 UG/ML
25 INJECTION, SOLUTION INTRAMUSCULAR; INTRAVENOUS
Status: DISCONTINUED | OUTPATIENT
Start: 2019-04-23 | End: 2019-04-23 | Stop reason: HOSPADM

## 2019-04-23 RX ORDER — PREDNISOLONE ACETATE 10 MG/ML
SUSPENSION/ DROPS OPHTHALMIC AS NEEDED
Status: DISCONTINUED | OUTPATIENT
Start: 2019-04-23 | End: 2019-04-23 | Stop reason: HOSPADM

## 2019-04-23 RX ORDER — LIDOCAINE HYDROCHLORIDE 10 MG/ML
0.1 INJECTION, SOLUTION EPIDURAL; INFILTRATION; INTRACAUDAL; PERINEURAL AS NEEDED
Status: DISCONTINUED | OUTPATIENT
Start: 2019-04-23 | End: 2019-04-23 | Stop reason: HOSPADM

## 2019-04-23 RX ORDER — SODIUM CHLORIDE 9 MG/ML
25 INJECTION, SOLUTION INTRAVENOUS ONCE
Status: COMPLETED | OUTPATIENT
Start: 2019-04-23 | End: 2019-04-23

## 2019-04-23 RX ORDER — PHENYLEPHRINE HYDROCHLORIDE 25 MG/ML
1 SOLUTION/ DROPS OPHTHALMIC
Status: COMPLETED | OUTPATIENT
Start: 2019-04-23 | End: 2019-04-23

## 2019-04-23 RX ORDER — PHENYLEPHRINE HYDROCHLORIDE 25 MG/ML
SOLUTION/ DROPS OPHTHALMIC
Status: COMPLETED
Start: 2019-04-23 | End: 2019-04-23

## 2019-04-23 RX ADMIN — PHENYLEPHRINE HYDROCHLORIDE 1 DROP: 2.5 SOLUTION/ DROPS OPHTHALMIC at 07:07

## 2019-04-23 RX ADMIN — TROPICAMIDE 1 DROP: 10 SOLUTION/ DROPS OPHTHALMIC at 07:11

## 2019-04-23 RX ADMIN — PHENYLEPHRINE HYDROCHLORIDE 1 DROP: 25 SOLUTION/ DROPS OPHTHALMIC at 07:23

## 2019-04-23 RX ADMIN — CYCLOPENTOLATE HYDROCHLORIDE 1 DROP: 20 SOLUTION/ DROPS OPHTHALMIC at 07:23

## 2019-04-23 RX ADMIN — CYCLOPENTOLATE HYDROCHLORIDE 1 DROP: 20 SOLUTION/ DROPS OPHTHALMIC at 07:12

## 2019-04-23 RX ADMIN — CYCLOPENTOLATE HYDROCHLORIDE 1 DROP: 20 SOLUTION/ DROPS OPHTHALMIC at 07:18

## 2019-04-23 RX ADMIN — MIDAZOLAM HYDROCHLORIDE 1 MG: 1 INJECTION, SOLUTION INTRAMUSCULAR; INTRAVENOUS at 08:02

## 2019-04-23 RX ADMIN — TROPICAMIDE 1 DROP: 10 SOLUTION/ DROPS OPHTHALMIC at 07:23

## 2019-04-23 RX ADMIN — SODIUM CHLORIDE 25 ML/HR: 900 INJECTION, SOLUTION INTRAVENOUS at 07:03

## 2019-04-23 RX ADMIN — TROPICAMIDE 1 DROP: 10 SOLUTION/ DROPS OPHTHALMIC at 07:07

## 2019-04-23 RX ADMIN — TROPICAMIDE 1 DROP: 10 SOLUTION/ DROPS OPHTHALMIC at 07:18

## 2019-04-23 RX ADMIN — CYCLOPENTOLATE HYDROCHLORIDE 1 DROP: 20 SOLUTION/ DROPS OPHTHALMIC at 07:06

## 2019-04-23 RX ADMIN — MIDAZOLAM HYDROCHLORIDE 1 MG: 1 INJECTION, SOLUTION INTRAMUSCULAR; INTRAVENOUS at 08:13

## 2019-04-23 RX ADMIN — SODIUM CHLORIDE: 9 INJECTION, SOLUTION INTRAVENOUS at 08:00

## 2019-04-23 RX ADMIN — PHENYLEPHRINE HYDROCHLORIDE 1 DROP: 25 SOLUTION/ DROPS OPHTHALMIC at 07:11

## 2019-04-23 RX ADMIN — PROPARACAINE HYDROCHLORIDE 1 DROP: 5 SOLUTION/ DROPS OPHTHALMIC at 07:12

## 2019-04-23 RX ADMIN — PHENYLEPHRINE HYDROCHLORIDE 1 DROP: 25 SOLUTION/ DROPS OPHTHALMIC at 07:18

## 2019-04-23 RX ADMIN — PHENYLEPHRINE HYDROCHLORIDE 1 DROP: 25 SOLUTION/ DROPS OPHTHALMIC at 07:07

## 2019-04-23 RX ADMIN — PROPARACAINE HYDROCHLORIDE 1 DROP: 5 SOLUTION/ DROPS OPHTHALMIC at 07:06

## 2019-04-23 NOTE — DISCHARGE INSTRUCTIONS
CATARACT POST-OP INSTRUCTIONS    DAY OF SURGERY:  Remove patch  2 hours after you get home & start drops      1. Antibiotic - TAN TOP (Ofloxacin)                  1 drop in operated eye every 2 hours while awake                    2. Steroid - PINK OR WHITE TOP (Prednisolone acetate)                  SHAKE WELL BEFORE USING (10-15X)                  1 drop in operated eye every 2 hours while awake.                                                                        ________________________________________________________________________    STARTING ON THE DAY AFTER SURGERY:     1. Antibiotic - TAN TOP (Ofloxacin)                 1 drop in operated eye 4x per day for 1 week then STOP                   2. Steroid - PINK OR WHITE TOP (Prednisolone acetate)                  SHAKE WELL BEFORE USING (10-15X)                  1 drop in operated eye every 2 hours while awake x  1  week, then                  1 drop in operated eye 4x per day x  1  week then,                  1 drop in operated eye 3x per day x  1  week then,                  1 drop in operated eye 2x per day x  1  week then,                  1 drop in operated eye 1x per day x  1  week then,                                                              SPACE OUT ALL EYE DROPS BY 5 MINUTES SO YOU DON'T [address] OUT  ONE MEDICATION WITH THE OTHER ONE.      EYE CARE:    1.  DO NOT RUB YOUR EYE!  2.  Wear eye shield when sleeping for 1st week after surgery  3. No heavy lifting (over 15 pounds) or bending below the waist or straining (hard coughing/sneezing/bowel movements - take stool softener if needed)  for 5 days after surgery. 4.  Do not get dirty water in your surgery eye. You can shower if you cover and close your surgery eye.  5.  No makeup for 5 days after surgery on or around the surgery eye. No gardening or swimming for 3 weeks. Wear safety glasses whenever dirt may get in your eyes.   6.  Use all other eye medications you were on before surgery unless instructed by your eye doctor not to do so.  7.  Wash your hands before touching your eye or your eye drops. Do not contaminate the tips of the bottles. 8.  Call immediately for severe pain, worse redness, decreased vision, new flashing lights or floaters. A scratchy sensation is normal and should get better as your eye heals. 9.  Please call with any questions about your postop care. You can reach Dr. Jag Haas at phone number:  [phone] (office) or [phone] (cell) in an emergency. DO NOT TAKE SLEEPING MEDICATIONS OR ANTIANXIETY MEDICATIONS WHILE TAKING NARCOTIC PAIN MEDICATIONS,  ESPECIALLY THE NIGHT OF ANESTHESIA. CPAP PATIENTS BE SURE TO WEAR MACHINE WHENEVER NAPPING OR SLEEPING. DISCHARGE SUMMARY from Nurse    The following personal items collected during your admission are returned to you:   Dental Appliance: Dental Appliances: None  Vision: Visual Aid: None  Hearing Aid:    Jewelry: Jewelry: None  Clothing:    Other Valuables: Other Valuables: None  Valuables sent to safe:        PATIENT INSTRUCTIONS:    After general anesthesia or intravenous sedation, for 24 hours or while taking prescription Narcotics:  · Someone should be with you for the next 24 hours. ·  For your own safety, a responsible adult must drive you home. · Limit your activities  · Recommended activity: Rest today, up with assistance today. Do not climb stairs or shower unattended for the next 24 hours. · Please start with a soft bland diet and advance as tolerated (no nausea) to regular diet. · Do not drive and operate hazardous machinery  · Do not make important personal or business decisions  · Do  not drink alcoholic beverages  · If you have not urinated within 8 hours after discharge, please contact your surgeon on call.     Report the following to your surgeon:  · Excessive pain, swelling, redness or odor of or around the surgical area  · Temperature over 100.5  · Nausea and vomiting lasting longer than 4 hours or if unable to take medications  · Any signs of decreased circulation or nerve impairment to extremity: change in color, persistent  numbness, tingling, coldness or increase pain  ·   ·   · You will receive a Post Operative Call from one of the Recovery Room Nurses on the day after your surgery to check on you. It is very important for us to know how you are recovering after your surgery. If you have an issue or need to speak with someone, please call your surgeon, do not wait for the post operative call. · You may receive an e-mail or letter in the mail from [facility] regarding your experience with us in the Ambulatory Surgery Unit. Your feedback is valuable to us and we appreciate your participation in the survey. ·   .  · If the above instructions are not adequate or you are having problems after your surgery, call the physician at his office number. ·   · We wish youre a speedy recovery ? What to do at Home:      *  Please give a list of your current medications to your Primary Care Provider. *  Please update this list whenever your medications are discontinued, doses are      changed, or new medications (including over-the-counter products) are added. *  Please carry medication information at all times in case of emergency situations. These are general instructions for a healthy lifestyle:    No smoking/ No tobacco products/ Avoid exposure to second hand smoke    Surgeon General's Warning:  Quitting smoking now greatly reduces serious risk to your health.     Obesity, smoking, and sedentary lifestyle greatly increases your risk for illness    A healthy diet, regular physical exercise & weight monitoring are important for maintaining a healthy lifestyle    You may be retaining fluid if you have a history of heart failure or if you experience any of the following symptoms:  Weight gain of 3 pounds or more overnight or 5 pounds in a week, increased swelling in our hands or feet or shortness of breath while lying flat in bed. Please call your doctor as soon as you notice any of these symptoms; do not wait until your next office visit. Recognize signs and symptoms of STROKE:    B - Balance  E - Eyes    F-face looks uneven    A-arms unable to move or move even    S-speech slurred or non-existent    T-time-call 911 as soon as signs and symptoms begin-DO NOT go       Back to bed or wait to see if you get better-TIME IS BRAIN. If you have not received your influenza and/or pneumococcal vaccine, please follow up with your primary care physician. The discharge information has been reviewed with the patient and caregiver. The patient and caregiver verbalized understanding. TO PREVENT AN INFECTION      1. [address] YOUR HANDS     To prevent infection, good handwashing is the most important thing you or your caregiver can do.  Wash your hands with soap and water before you touch any wounds. 2.  USE CLEAN SHEETS     Use freshly cleaned sheets on your bed after surgery.  To keep the surgery site clean, do not allow pets to sleep with you while your wound is still healing. 3. STOP SMOKING    Stop smoking, or at least cut back on smoking     Smoking slows your healing. 4.  CONTROL YOUR BLOOD SUGAR     High blood sugars slow wound healing.  If you are diabetic, control your blood sugar levels before and after your surgery.

## 2019-04-23 NOTE — ANESTHESIA PREPROCEDURE EVALUATION
Relevant Problems No relevant active problems Anesthetic History No history of anesthetic complications Review of Systems / Medical History Patient summary reviewed, nursing notes reviewed and pertinent labs reviewed Pulmonary Smoker (former, quit 07/18) Neuro/Psych Within defined limits Cardiovascular Within defined limits Exercise tolerance: >4 METS 
  
GI/Hepatic/Renal 
Within defined limits Endo/Other Within defined limits Other Findings Comments: Cataract Physical Exam 
 
Airway Mallampati: III 
TM Distance: < 4 cm Neck ROM: normal range of motion Mouth opening: Normal 
 
 Cardiovascular Rhythm: regular Rate: normal 
 
 
Pertinent negatives: No murmur Dental 
No notable dental hx Pulmonary Breath sounds clear to auscultation Abdominal 
GI exam deferred Other Findings Anesthetic Plan ASA: 2 Anesthesia type: MAC Induction: Intravenous Anesthetic plan and risks discussed with: Patient

## 2019-04-23 NOTE — OP NOTES
Date of Procedure: 4/23/2019     Preop Diagnosis: Visually impairing combined cataract Right eye. Postop Diagnosis: same     Procedure/Surgery: Phacoemulsification with intraocular lens placement Right eye. Surgeon(s) and Role:     * Theresa Strauss MD - Primary    Anesthesia: MAC     Blood Loss: None    Complications: None    Speciman Removed: * No specimens in log *     PHACO TIME:   0:36.7 seconds. CDE 3.53    Implants:   Implant Name Type Inv. Item Serial No.  Lot No. LRB No. Used Action   LENS IOL POST 1-PC 6X13 26.5 -- [employer] - [id number]  LENS IOL POST 1-PC 6X13 26.5 -- [employer] [id number] [employer]  Right 1 Implanted       Procedure: The patient was consented. The operative eye was confirmed and marked in the preoperative holding area. Dilation was confirmed and the patient was brought into the operating room. The eye was prepped and draped in the typical aseptic fashion. Lid speculum was placed. Paracentesis was made and preservative-free lidocaine was injected into the anterior chamber followed by viscoelastic (Viscoat) to fill the anterior chamber. A triplanar self-sealing temporal clear cornea incision was made with a 2.4 mm keratome. A continuous curvilinear capsulorrhexis was initiated with a cystotome and completed with Utrata forceps. Hydrodissection and hydrodelination were performed with confirmed free rotation of the nucleus. Phacoemulsification was peformed in a stop-and-chop fashion followed by cortical removal with irrigation/aspiration. The capsular bag was reinflated with viscoelastic (Healon). The lens type and power was confirmed and the lens injected into the capsular bag. Residual viscoelastic was removed and the eye was formed with BSS. Stromal hydration was performed. The wound and paracentesis were watertight at the end of the case. The lens was well centered and the pupil was round at the end of the case.   The lid speculum was removed. Postoperative eyedrops of ofloxacin and Pred Forte were instilled in the eye. An eye shield was placed over the eye. The patient tolerated the surgery well and was taken to the postoperative holding area in a stable condition.     Soila Hewitt MD    4/23/2019  8:37 AM

## 2019-04-23 NOTE — PERIOP NOTES
Alextata Abran 1965 
[id number] Situation: 
Verbal report given from: ALFREDO Chavez CRNA, RN Procedure: Procedure(s): PHACOEMULSIFICATION WITH INTRAOCULAR LENS IMPLANTATION RIGHT EYE Background: 
 
Preoperative diagnosis: VISUALLY SIGNIFICANT CATARACT RIGHT EYE Postoperative diagnosis: VISUALLY SIGNIFICANT CATARACT RIGHT EYE :  Dr. Nish Barr Assistant(s): Circ-1: Tiffany Rendon RN 
Circ-Relief: Ana Farley RN Scrub Tech-1: Dalton Levine Specimens: * No specimens in log * Assessment: 
Intra-procedure medications Anesthesia gave intra-procedure sedation and medications, see anesthesia flow sheet Intravenous fluids: Berneta Bene Vital signs stable Recommendation:

## 2019-04-23 NOTE — PERIOP NOTES
Permission received to review discharge instructions and discuss private health information with Dayton Mortimer ()

## 2019-04-23 NOTE — ANESTHESIA POSTPROCEDURE EVALUATION
Procedure(s): PHACOEMULSIFICATION WITH INTRAOCULAR LENS IMPLANTATION RIGHT EYE. MAC Anesthesia Post Evaluation Multimodal analgesia: multimodal analgesia not used between 6 hours prior to anesthesia start to PACU discharge Patient location during evaluation: bedside Patient participation: complete - patient participated Level of consciousness: awake and alert Pain score: 0 Airway patency: patent Anesthetic complications: no 
Cardiovascular status: acceptable Respiratory status: acceptable Hydration status: acceptable Post anesthesia nausea and vomiting:  none No vitals data found for the desired time range.

## 2019-04-23 NOTE — PERIOP NOTES
Permission received to review discharge instructions and discuss private health information with  Shawn Rodriges. 0840- brought to bedside, updated on pt's status. VSS 
 
0844-D/c instructions reviewed, all questions answered. VSS. Reviewed when to start drops, when to call the doctor, and hygiene care. 0850-Transported via w/c to awaiting transportation. No complaints at time of d/c home.

## 2019-05-21 ENCOUNTER — OFFICE VISIT (OUTPATIENT)
Dept: INTERNAL MEDICINE CLINIC | Age: 54
End: 2019-05-21

## 2019-05-21 VITALS
WEIGHT: 120.8 LBS | SYSTOLIC BLOOD PRESSURE: 120 MMHG | BODY MASS INDEX: 18.31 KG/M2 | HEIGHT: 68 IN | OXYGEN SATURATION: 90 % | RESPIRATION RATE: 22 BRPM | HEART RATE: 95 BPM | DIASTOLIC BLOOD PRESSURE: 78 MMHG | TEMPERATURE: 98.7 F

## 2019-05-21 DIAGNOSIS — J20.9 ACUTE BRONCHITIS, UNSPECIFIED ORGANISM: Primary | ICD-10-CM

## 2019-05-21 RX ORDER — CEFUROXIME AXETIL 250 MG/1
250 TABLET ORAL 2 TIMES DAILY
Qty: 14 TAB | Refills: 0 | Status: SHIPPED | OUTPATIENT
Start: 2019-05-21

## 2019-05-21 RX ORDER — BENZONATATE 200 MG/1
200 CAPSULE ORAL
Qty: 21 CAP | Refills: 0 | Status: SHIPPED | OUTPATIENT
Start: 2019-05-21 | End: 2019-05-28

## 2019-05-21 RX ORDER — PREDNISONE 5 MG/1
TABLET ORAL
Qty: 15 TAB | Refills: 0 | Status: SHIPPED | OUTPATIENT
Start: 2019-05-21

## 2019-05-21 NOTE — PATIENT INSTRUCTIONS
Bronchitis: Care Instructions Your Care Instructions Bronchitis is inflammation of the bronchial tubes, which carry air to the lungs. The tubes swell and produce mucus, or phlegm. The mucus and inflamed bronchial tubes make you cough. You may have trouble breathing. Most cases of bronchitis are caused by viruses like those that cause colds. Antibiotics usually do not help and they may be harmful. Bronchitis usually develops rapidly and lasts about 2 to 3 weeks in otherwise healthy people. Follow-up care is a key part of your treatment and safety. Be sure to make and go to all appointments, and call your doctor if you are having problems. It's also a good idea to know your test results and keep a list of the medicines you take. How can you care for yourself at home? · Take all medicines exactly as prescribed. Call your doctor if you think you are having a problem with your medicine. · Get some extra rest. 
· Take an over-the-counter pain medicine, such as acetaminophen (Tylenol), ibuprofen (Advil, Motrin), or naproxen (Aleve) to reduce fever and relieve body aches. Read and follow all instructions on the label. · Do not take two or more pain medicines at the same time unless the doctor told you to. Many pain medicines have acetaminophen, which is Tylenol. Too much acetaminophen (Tylenol) can be harmful. · Take an over-the-counter cough medicine that contains dextromethorphan to help quiet a dry, hacking cough so that you can sleep. Avoid cough medicines that have more than one active ingredient. Read and follow all instructions on the label. · Breathe moist air from a humidifier, hot shower, or sink filled with hot water. The heat and moisture will thin mucus so you can cough it out. · Do not smoke. Smoking can make bronchitis worse. If you need help quitting, talk to your doctor about stop-smoking programs and medicines. These can increase your chances of quitting for good. When should you call for help? Call 911 anytime you think you may need emergency care. For example, call if: 
  · You have severe trouble breathing.  
 Call your doctor now or seek immediate medical care if: 
  · You have new or worse trouble breathing.  
  · You cough up dark brown or bloody mucus (sputum).  
  · You have a new or higher fever.  
  · You have a new rash.  
 Watch closely for changes in your health, and be sure to contact your doctor if: 
  · You cough more deeply or more often, especially if you notice more mucus or a change in the color of your mucus.  
  · You are not getting better as expected. Where can you learn more? Go to http://maria teresa-esau.info/. Enter H333 in the search box to learn more about \"Bronchitis: Care Instructions. \" Current as of: September 5, 2018 Content Version: 11.9 © 2498-5691 [employer]. Care instructions adapted under license by [employer] (which disclaims liability or warranty for this information). If you have questions about a medical condition or this instruction, always ask your healthcare professional. [address] any warranty or liability for your use of this information.

## 2019-05-21 NOTE — PROGRESS NOTES
Tyra Lomax is a 52 y.o. female presenting for Cough  . 1. Have you been to the ER, urgent care clinic since your last visit? Hospitalized since your last visit? Yes When: 4-9-19 (L) and 4-23-19 (R) [facility] Reason for visit: Cataract removal    2. Have you seen or consulted any other health care providers outside of the [address] [place] [address] since your last visit? Include any pap smears or colon screening. No    No flowsheet data found. No flowsheet data found. 3 most recent PHQ Screens 5/21/2019   Little interest or pleasure in doing things Not at all   Feeling down, depressed, irritable, or hopeless Not at all   Total Score PHQ 2 0       There are no discontinued medications.

## 2019-05-21 NOTE — PROGRESS NOTES
This note will not be viewable in [address] Katty Mahajan is a 52 y.o. female and presents with Cough  . Subjective:  Mrs. Taylor presents to the office today with 1 weeks worth of an upper respiratory infection with the development of a congested cough. The cough has been productive of some purulent phlegm intermittently. She denies high fever or shaking chills and denies pleuritic pain or shortness of breath. She denies rhinorrhea or sore throat. There is been no neck stiffness or rash. Patient has a history of smoking but states that she quit in 2018. Past Medical History:   Diagnosis Date    Cataract      Past Surgical History:   Procedure Laterality Date    HX CATARACT REMOVAL Left 2019    HX CHOLECYSTECTOMY  1980s    HX HYSTERECTOMY      Dr. Zoë Bell HX KNEE ARTHROSCOPY Right     HX WISDOM TEETH EXTRACTION       No Known Allergies  Current Outpatient Medications   Medication Sig Dispense Refill    cefUROXime (CEFTIN) 250 mg tablet Take 1 Tab by mouth two (2) times a day. 14 Tab 0    predniSONE (DELTASONE) 5 mg tablet Take 5 tablets day one, take 4 tablets day two, take 3 tablets day three, take 2 tablets day four, take 1 tablet day five. 15 Tab 0    benzonatate (TESSALON) 200 mg capsule Take 1 Cap by mouth three (3) times daily as needed for Cough for up to 7 days. 21 Cap 0    ibuprofen (MOTRIN IB) 200 mg tablet Take 200 mg by mouth every six (6) hours as needed for Pain.        Social History     Socioeconomic History    Marital status:      Spouse name: Not on file    Number of children: Not on file    Years of education: Not on file    Highest education level: Not on file   Tobacco Use    Smoking status: Former Smoker     Last attempt to quit: 2018     Years since quittin.8    Smokeless tobacco: Never Used   Substance and Sexual Activity    Alcohol use: Not Currently    Drug use: Never     Family History   Problem Relation Age of Onset    No Known Problems Mother     Cancer Father        Review of Systems  Constitutional: negative for fevers, chills, anorexia and weight loss  Eyes:   negative for visual disturbance and irritation  ENT:   Positive for some sinus congestion and post nasal drainage. Respiratory:  Positive for cough and chest congestion without wheezing  CV:   negative for chest pain, palpitations, lower extremity edema  GI:   negative for nausea, vomiting, diarrhea, abdominal pain,melena  Integumentary: negative for rash and pruritus  Neurological:  negative for headaches, dizziness, vertigo, memory problems and gait       Objective:  Visit Vitals  /78 (BP 1 Location: Right arm, BP Patient Position: Sitting)   Pulse 95   Temp 98.7 °F (37.1 °C) (Oral)   Resp 22   Ht 5' 8\" (1.727 m)   Wt 120 lb 12.8 oz (54.8 kg)   SpO2 90%   BMI 18.37 kg/m²     Body mass index is 18.37 kg/m². Physical Exam:   General appearance - alert, ill appearing, and in no distress  Mental status - alert, oriented to person, place, and time  EYE-CR, EOMI, conjuctiva clear. No lid swelling or purulent drainage  ENT- TM's clear without A/F level. Pharynx slightly erythematous with drainage noted  Nose - normal and patent, no erythema,  Neck - supple, no significant adenopathy   Chest - Coarse upper airway rhonchi present with minimal wheezing heard  Heart - normal rate, regular rhythm, normal S1, S2, no murmurs, rubs, clicks or gallops   Skin-No rash appreciated  Neuro -alert, oriented, normal speech, no focal findings. Assessment/Plan:  Diagnoses and all orders for this visit:    Acute bronchitis, unspecified organism  -     cefUROXime (CEFTIN) 250 mg tablet; Take 1 Tab by mouth two (2) times a day., Normal, Disp-14 Tab, R-0  -     predniSONE (DELTASONE) 5 mg tablet;  Take 5 tablets day one, take 4 tablets day two, take 3 tablets day three, take 2 tablets day four, take 1 tablet day five., Normal, Disp-15 Tab, R-0  -     benzonatate (TESSALON) 200 mg capsule; Take 1 Cap by mouth three (3) times daily as needed for Cough for up to 7 days. , Normal, Disp-21 Cap, R-0        Other Instructions:  Have prescribed Ceftin x7 days along with tapering prednisone due to her mild bronchospasm. Tessalon Perles given for cough control    Mucinex as directed    Increase po fluids    Follow-up and Dispositions    · Return if symptoms worsen or fail to improve. I have reviewed with the patient details of the assessment and plan and all questions were answered. Relevent patient education was performed. An After Visit Summary was printed and given to the patient.     Janell Louise MD

## 2019-05-28 ENCOUNTER — TELEPHONE (OUTPATIENT)
Dept: INTERNAL MEDICINE CLINIC | Age: 54
End: 2019-05-28

## 2019-05-28 NOTE — TELEPHONE ENCOUNTER
Patient is calling, she advises since finishing her medication (finished yesterday) that was prescribed on 5/21, she is not feeling any better. She has developed another low grade fever (100.8), cough, and feeling fatigued. She would like to know what to do about this.     Best call back # for patient: [phone]  Pharmacy on file verified

## 2019-05-28 NOTE — TELEPHONE ENCOUNTER
Patient refused appointment that was offered for 5/29 at 8:10am. She states it is hard for her to get in and that she would give it a few days; she has taken tylenol for her fever.

## 2022-03-19 PROBLEM — H25.811 COMBINED FORM OF AGE-RELATED CATARACT, RIGHT EYE: Status: ACTIVE | Noted: 2019-04-23

## 2023-07-19 NOTE — PROGRESS NOTES
Subjective: (As above and below)     Chief Complaint   Patient presents with    [address] is a 59 y.o. female  and presents to the office to establish care. Today her main concern is that she has been losing weight. About a year ago she had to get dentures and she has dropped a lot of weight since that time, thinks she has been losing weight a little bit before oral surgery. She notes she struggles everyday to gain weight and due to the weight loss she is weak and tired. \"It is hard for me to make it through the day. It takes a lot out of me. \" She enjoys going out into the yard and do yard work but this can be very exhausting for her. She has a good appetite, eats 3 meals a day and eats a snack. She reports prior to losing weight she was about 125 lbs and her home scale was 94 lb recently and she was excited today that our scale notes she was 99 lbs. She denies difficulty swallowing, chewing, dizziness, cough, choking, hemoptysis, fever, night sweat, temperature intolerance, hair loss, dry skin, chest pain, SOB, abdominal pain, N/V/D, constipation, heart burn blood in stool. No concern for HIV. She denies any recent depression or stressors. Denies any difficulty sleeping. Nutrition Hx:  Diet: Breakfast: bowl of cereal, Lunch: [employer] sandwich or pizza, Dinner: Steak or chicken,some vegetables. She finishes her plate. Pepsi, green tea or coffee. No water. She had a quesadilla last night and she notes she ate the entire plate. Exercise: Work       She denies family history of cardiac disease or early cardiac death   Denies family hx of colon cancer. Preventative:   Pap smear: Over 10 years ago. Hx of partial hysterectomy   Mammogram: Over 10 years   Colonoscopy: Never done. Immunization:   Had initial COVID vaccine. TDAP greater than 10 years and has not had shingles vaccines.        Nutrition Hx:  Diet: Breakfast: bowl of cereal, Lunch: [employer] sandwich or pizza, Dinner:

## 2023-07-20 ENCOUNTER — OFFICE VISIT (OUTPATIENT)
Age: 58
End: 2023-07-20
Payer: COMMERCIAL

## 2023-07-20 VITALS
RESPIRATION RATE: 17 BRPM | WEIGHT: 99 LBS | OXYGEN SATURATION: 95 % | BODY MASS INDEX: 15.01 KG/M2 | DIASTOLIC BLOOD PRESSURE: 76 MMHG | SYSTOLIC BLOOD PRESSURE: 117 MMHG | TEMPERATURE: 98.6 F | HEIGHT: 68 IN | HEART RATE: 78 BPM

## 2023-07-20 DIAGNOSIS — R63.4 UNINTENTIONAL WEIGHT LOSS: ICD-10-CM

## 2023-07-20 DIAGNOSIS — Z12.11 COLON CANCER SCREENING: ICD-10-CM

## 2023-07-20 DIAGNOSIS — Z11.59 NEED FOR HEPATITIS C SCREENING TEST: ICD-10-CM

## 2023-07-20 DIAGNOSIS — Z13.220 LIPID SCREENING: ICD-10-CM

## 2023-07-20 DIAGNOSIS — Z00.00 ENCOUNTER FOR MEDICAL EXAMINATION TO ESTABLISH CARE: Primary | ICD-10-CM

## 2023-07-20 DIAGNOSIS — R53.83 OTHER FATIGUE: ICD-10-CM

## 2023-07-20 DIAGNOSIS — R73.02 IMPAIRED GLUCOSE TOLERANCE: ICD-10-CM

## 2023-07-20 DIAGNOSIS — Z12.31 ENCOUNTER FOR SCREENING MAMMOGRAM FOR MALIGNANT NEOPLASM OF BREAST: ICD-10-CM

## 2023-07-20 PROCEDURE — 99204 OFFICE O/P NEW MOD 45 MIN: CPT

## 2023-07-20 SDOH — ECONOMIC STABILITY: FOOD INSECURITY: WITHIN THE PAST 12 MONTHS, THE FOOD YOU BOUGHT JUST DIDN'T LAST AND YOU DIDN'T HAVE MONEY TO GET MORE.: NEVER TRUE

## 2023-07-20 SDOH — ECONOMIC STABILITY: FOOD INSECURITY: WITHIN THE PAST 12 MONTHS, YOU WORRIED THAT YOUR FOOD WOULD RUN OUT BEFORE YOU GOT MONEY TO BUY MORE.: NEVER TRUE

## 2023-07-20 SDOH — ECONOMIC STABILITY: HOUSING INSECURITY
IN THE LAST 12 MONTHS, WAS THERE A TIME WHEN YOU DID NOT HAVE A STEADY PLACE TO SLEEP OR SLEPT IN A SHELTER (INCLUDING NOW)?: NO

## 2023-07-20 SDOH — ECONOMIC STABILITY: INCOME INSECURITY: HOW HARD IS IT FOR YOU TO PAY FOR THE VERY BASICS LIKE FOOD, HOUSING, MEDICAL CARE, AND HEATING?: NOT HARD AT ALL

## 2023-07-20 ASSESSMENT — ENCOUNTER SYMPTOMS
SINUS PRESSURE: 0
FACIAL SWELLING: 0
VOICE CHANGE: 0
ABDOMINAL DISTENTION: 0
EYE REDNESS: 0
EYE DISCHARGE: 0
NAUSEA: 0
SORE THROAT: 0
CHEST TIGHTNESS: 0
WHEEZING: 0
EYE PAIN: 0
VOMITING: 0
RHINORRHEA: 0
DIARRHEA: 0
ABDOMINAL PAIN: 0
CHOKING: 0
SHORTNESS OF BREATH: 0
BLOOD IN STOOL: 0
COUGH: 0
APNEA: 0
SINUS PAIN: 0
PHOTOPHOBIA: 0
CONSTIPATION: 0
TROUBLE SWALLOWING: 0
STRIDOR: 0
EYE ITCHING: 0

## 2023-07-20 ASSESSMENT — PATIENT HEALTH QUESTIONNAIRE - PHQ9
SUM OF ALL RESPONSES TO PHQ QUESTIONS 1-9: 0
1. LITTLE INTEREST OR PLEASURE IN DOING THINGS: 0
SUM OF ALL RESPONSES TO PHQ QUESTIONS 1-9: 0
2. FEELING DOWN, DEPRESSED OR HOPELESS: 0
SUM OF ALL RESPONSES TO PHQ QUESTIONS 1-9: 0
SUM OF ALL RESPONSES TO PHQ QUESTIONS 1-9: 0
SUM OF ALL RESPONSES TO PHQ9 QUESTIONS 1 & 2: 0

## 2023-07-20 NOTE — PROGRESS NOTES
Chief Complaint   Patient presents with    [facility] Maintenance Due   Topic Date Due    COVID-19 Vaccine (1) Never done    Depression Screen  Never done    HIV screen  Never done    Hepatitis C screen  Never done    DTaP/Tdap/Td vaccine (1 - Tdap) Never done    Lipids  Never done    Colorectal Cancer Screen  Never done    Breast cancer screen  Never done    Shingles vaccine (1 of 2) Never done           1. \"Have you been to the ER, urgent care clinic since your last visit? Hospitalized since your last visit? \" No    2. \"Have you seen or consulted any other health care providers outside of the [address] [place] [address] since your last visit? \" No     3. For patients aged 43-73: Has the patient had a colonoscopy / FIT/ Cologuard? NA - based on age      If the patient is female:    4. For patients aged 43-66: Has the patient had a mammogram within the past 2 years? No      5. For patients aged 21-65: Has the patient had a pap smear?  No

## 2023-07-21 LAB
25(OH)D3 SERPL-MCNC: 40 NG/ML (ref 30–100)
ALBUMIN SERPL-MCNC: 4.1 G/DL (ref 3.5–5)
ALBUMIN/GLOB SERPL: 1.2 (ref 1.1–2.2)
ALP SERPL-CCNC: 114 U/L (ref 45–117)
ALT SERPL-CCNC: 54 U/L (ref 12–78)
ANION GAP SERPL CALC-SCNC: 3 MMOL/L (ref 5–15)
AST SERPL-CCNC: 34 U/L (ref 15–37)
BASOPHILS # BLD: 0.1 K/UL (ref 0–0.1)
BASOPHILS NFR BLD: 1 % (ref 0–1)
BILIRUB SERPL-MCNC: 0.2 MG/DL (ref 0.2–1)
BUN SERPL-MCNC: 16 MG/DL (ref 6–20)
BUN/CREAT SERPL: 24 (ref 12–20)
CALCIUM SERPL-MCNC: 10.2 MG/DL (ref 8.5–10.1)
CHLORIDE SERPL-SCNC: 107 MMOL/L (ref 97–108)
CHOLEST SERPL-MCNC: 217 MG/DL
CO2 SERPL-SCNC: 29 MMOL/L (ref 21–32)
CREAT SERPL-MCNC: 0.66 MG/DL (ref 0.55–1.02)
DIFFERENTIAL METHOD BLD: ABNORMAL
EOSINOPHIL # BLD: 0.1 K/UL (ref 0–0.4)
EOSINOPHIL NFR BLD: 1 % (ref 0–7)
ERYTHROCYTE [DISTWIDTH] IN BLOOD BY AUTOMATED COUNT: 13 % (ref 11.5–14.5)
EST. AVERAGE GLUCOSE BLD GHB EST-MCNC: 111 MG/DL
FERRITIN SERPL-MCNC: 92 NG/ML (ref 26–388)
GLOBULIN SER CALC-MCNC: 3.3 G/DL (ref 2–4)
GLUCOSE SERPL-MCNC: 81 MG/DL (ref 65–100)
HBA1C MFR BLD: 5.5 % (ref 4–5.6)
HCT VFR BLD AUTO: 45.4 % (ref 35–47)
HCV AB SERPL QL IA: NONREACTIVE
HDLC SERPL-MCNC: 53 MG/DL
HDLC SERPL: 4.1 (ref 0–5)
HGB BLD-MCNC: 14.3 G/DL (ref 11.5–16)
IMM GRANULOCYTES # BLD AUTO: 0 K/UL (ref 0–0.04)
IMM GRANULOCYTES NFR BLD AUTO: 0 % (ref 0–0.5)
IRON SATN MFR SERPL: 38 % (ref 20–50)
IRON SERPL-MCNC: 124 UG/DL (ref 35–150)
LDLC SERPL CALC-MCNC: 114.4 MG/DL (ref 0–100)
LYMPHOCYTES # BLD: 2 K/UL (ref 0.8–3.5)
LYMPHOCYTES NFR BLD: 29 % (ref 12–49)
MCH RBC QN AUTO: 31.3 PG (ref 26–34)
MCHC RBC AUTO-ENTMCNC: 31.5 G/DL (ref 30–36.5)
MCV RBC AUTO: 99.3 FL (ref 80–99)
MONOCYTES # BLD: 0.8 K/UL (ref 0–1)
MONOCYTES NFR BLD: 12 % (ref 5–13)
NEUTS SEG # BLD: 3.8 K/UL (ref 1.8–8)
NEUTS SEG NFR BLD: 57 % (ref 32–75)
NRBC # BLD: 0 K/UL (ref 0–0.01)
NRBC BLD-RTO: 0 PER 100 WBC
PLATELET # BLD AUTO: 293 K/UL (ref 150–400)
PMV BLD AUTO: 11.7 FL (ref 8.9–12.9)
POTASSIUM SERPL-SCNC: 5.6 MMOL/L (ref 3.5–5.1)
PROT SERPL-MCNC: 7.4 G/DL (ref 6.4–8.2)
RBC # BLD AUTO: 4.57 M/UL (ref 3.8–5.2)
SODIUM SERPL-SCNC: 139 MMOL/L (ref 136–145)
T4 FREE SERPL-MCNC: 0.9 NG/DL (ref 0.8–1.5)
TIBC SERPL-MCNC: 323 UG/DL (ref 250–450)
TRIGL SERPL-MCNC: 248 MG/DL
TSH SERPL DL<=0.05 MIU/L-ACNC: 1.27 UIU/ML (ref 0.36–3.74)
VLDLC SERPL CALC-MCNC: 49.6 MG/DL
WBC # BLD AUTO: 6.8 K/UL (ref 3.6–11)

## 2023-07-25 ENCOUNTER — TELEPHONE (OUTPATIENT)
Age: 58
End: 2023-07-25

## 2023-07-25 NOTE — TELEPHONE ENCOUNTER
Pt is returning a call concerning her lab results.     Pt stated she is unable to answer her phone while at work and is requesting a detailed vm of her results

## 2023-07-27 NOTE — TELEPHONE ENCOUNTER
Left a detailed message with lab results. If pt calls back, please schedule pt for a lab appointment to recheck potassium.  Also, please ask pt if she would like to be referred to GI for weight loss and a colonoscopy

## 2023-07-28 ENCOUNTER — TELEPHONE (OUTPATIENT)
Age: 58
End: 2023-07-28

## 2023-08-01 ENCOUNTER — NURSE ONLY (OUTPATIENT)
Age: 58
End: 2023-08-01

## 2023-08-01 DIAGNOSIS — E87.5 SERUM POTASSIUM ELEVATED: ICD-10-CM

## 2023-08-01 DIAGNOSIS — E87.5 SERUM POTASSIUM ELEVATED: Primary | ICD-10-CM

## 2023-08-02 LAB
ANION GAP SERPL CALC-SCNC: 6 MMOL/L (ref 5–15)
BUN SERPL-MCNC: 17 MG/DL (ref 6–20)
BUN/CREAT SERPL: 20 (ref 12–20)
CALCIUM SERPL-MCNC: 9.9 MG/DL (ref 8.5–10.1)
CHLORIDE SERPL-SCNC: 109 MMOL/L (ref 97–108)
CO2 SERPL-SCNC: 25 MMOL/L (ref 21–32)
CREAT SERPL-MCNC: 0.86 MG/DL (ref 0.55–1.02)
GLUCOSE SERPL-MCNC: 97 MG/DL (ref 65–100)
POTASSIUM SERPL-SCNC: 4.3 MMOL/L (ref 3.5–5.1)
SODIUM SERPL-SCNC: 140 MMOL/L (ref 136–145)

## 2023-09-27 ENCOUNTER — TELEPHONE (OUTPATIENT)
Age: 58
End: 2023-09-27

## 2023-09-27 NOTE — TELEPHONE ENCOUNTER
Pt is calling to discuss her lab results from 07/20 and 08/01    Please leave detailed vm if no answer

## 2024-01-19 ENCOUNTER — TELEPHONE (OUTPATIENT)
Age: 59
End: 2024-01-19

## 2024-01-19 NOTE — TELEPHONE ENCOUNTER
Left voicemail for patient to call back to make mammogram appointment   Call Me back at [phone]  My name is Marsha   Or  call Central Scheduling [phone]

## 2024-02-19 ENCOUNTER — HOSPITAL ENCOUNTER (OUTPATIENT)
Facility: HOSPITAL | Age: 59
Discharge: HOME | End: 2024-02-22
Payer: COMMERCIAL

## 2024-02-19 VITALS — BODY MASS INDEX: 14.66 KG/M2 | WEIGHT: 98.99 LBS | HEIGHT: 69 IN

## 2024-02-19 DIAGNOSIS — Z12.31 ENCOUNTER FOR SCREENING MAMMOGRAM FOR MALIGNANT NEOPLASM OF BREAST: ICD-10-CM

## 2024-02-19 PROCEDURE — 77067 SCR MAMMO BI INCL CAD: CPT

## 2024-03-08 ENCOUNTER — HOSPITAL ENCOUNTER (OUTPATIENT)
Facility: HOSPITAL | Age: 59
End: 2024-03-08
Payer: COMMERCIAL

## 2024-03-08 ENCOUNTER — HOSPITAL ENCOUNTER (OUTPATIENT)
Facility: HOSPITAL | Age: 59
Discharge: HOME | End: 2024-03-08
Payer: COMMERCIAL

## 2024-03-08 DIAGNOSIS — R92.8 ABNORMAL MAMMOGRAM OF LEFT BREAST: ICD-10-CM

## 2024-03-08 PROCEDURE — 76642 ULTRASOUND BREAST LIMITED: CPT

## 2024-03-08 PROCEDURE — G0279 TOMOSYNTHESIS, MAMMO: HCPCS

## 2024-06-25 ENCOUNTER — OFFICE VISIT (OUTPATIENT)
Age: 59
End: 2024-06-25
Payer: COMMERCIAL

## 2024-06-25 VITALS
HEART RATE: 74 BPM | WEIGHT: 95.6 LBS | TEMPERATURE: 97.9 F | RESPIRATION RATE: 18 BRPM | DIASTOLIC BLOOD PRESSURE: 83 MMHG | HEIGHT: 68 IN | BODY MASS INDEX: 14.49 KG/M2 | SYSTOLIC BLOOD PRESSURE: 127 MMHG | OXYGEN SATURATION: 97 %

## 2024-06-25 DIAGNOSIS — R05.1 ACUTE COUGH: ICD-10-CM

## 2024-06-25 DIAGNOSIS — B34.9 VIRAL SYNDROME: Primary | ICD-10-CM

## 2024-06-25 DIAGNOSIS — R06.2 WHEEZING: ICD-10-CM

## 2024-06-25 DIAGNOSIS — R50.9 FEVER, UNSPECIFIED FEVER CAUSE: ICD-10-CM

## 2024-06-25 LAB
QUICKVUE INFLUENZA TEST: NEGATIVE
VALID INTERNAL CONTROL, POC: YES

## 2024-06-25 PROCEDURE — 87804 INFLUENZA ASSAY W/OPTIC: CPT | Performed by: PHYSICIAN ASSISTANT

## 2024-06-25 PROCEDURE — 99213 OFFICE O/P EST LOW 20 MIN: CPT | Performed by: PHYSICIAN ASSISTANT

## 2024-06-25 RX ORDER — ALBUTEROL SULFATE 90 UG/1
2 AEROSOL, METERED RESPIRATORY (INHALATION) 4 TIMES DAILY PRN
Qty: 18 G | Refills: 0 | Status: SHIPPED | OUTPATIENT
Start: 2024-06-25

## 2024-06-25 RX ORDER — BENZONATATE 200 MG/1
200 CAPSULE ORAL 3 TIMES DAILY PRN
Qty: 30 CAPSULE | Refills: 0 | Status: SHIPPED | OUTPATIENT
Start: 2024-06-25

## 2024-06-25 ASSESSMENT — PATIENT HEALTH QUESTIONNAIRE - PHQ9
2. FEELING DOWN, DEPRESSED OR HOPELESS: NOT AT ALL
SUM OF ALL RESPONSES TO PHQ QUESTIONS 1-9: 0
1. LITTLE INTEREST OR PLEASURE IN DOING THINGS: NOT AT ALL
SUM OF ALL RESPONSES TO PHQ9 QUESTIONS 1 & 2: 0

## 2024-06-25 NOTE — PROGRESS NOTES
Chief Complaint   Patient presents with    Cough    Fatigue    Headache    Joint Pain     Covid test negative yesterday.         Health Maintenance Due   Topic Date Due    COVID-19 Vaccine (1) Never done    HIV screen  Never done    DTaP/Tdap/Td vaccine (1 - Tdap) Never done    Colorectal Cancer Screen  Never done    Shingles vaccine (1 of 2) Never done    Respiratory Syncytial Virus (RSV) Pregnant or age 60 yrs+ (1 - 1-dose 60+ series) Never done    Depression Screen  07/20/2024         \"Have you been to the ER, urgent care clinic since your last visit?  Hospitalized since your last visit?\"    NO    “Have you seen or consulted any other health care providers outside of [facility] since your last visit?”    NO    “Have you had a colorectal cancer screening such as a colonoscopy/FIT/Cologuard?    NO    No colonoscopy on file  No cologuard on file  No FIT/FOBT on file   No flexible sigmoidoscopy on file

## 2024-06-25 NOTE — PATIENT INSTRUCTIONS
Get the Chest Xray done if not improving over the next 3 days.    Repeat a home Covid test tomorrow and call us with the results.      Clear liquids for 24-36 hours.  -Such as Jello, popsicles, mix a glass with half Gatorade and half water, chicken broth    Then start saltines.    Bananas  Rice  Applesauce  Toast    No greasy, fried, spicy, acidic, or dairy products.    The only dairy for the next 5 days should be PLAIN Greek yogurt, one serving daily.    Consider probiotic, Florastor, 1 pill twice daily for 5 days.    Return to work when not vomiting for 24 hours, keeping down foods, and not on zofran.

## 2024-06-25 NOTE — PROGRESS NOTES
Subjective:   Aarti Traylor is a 58 y.o. female who complains of onset of myalgias, arthralgias, HA, fever (did not check with thermometer), cough, head congestion and diarrhea (4-5 x/day) for 5 days, gradually worsening since that time.  She reports some wheezing, denies h/o asthma  She is a former smoker  (+)Decreased appetite, nausea, she had one episode of \"dry heaving\", no vomiting  Had a spider bite on right arm about 1 week prior, had a \"whelp\" and then it faded out and is resolved  She denies a history of shortness of breath. Tried OTC cold remedies (mucinex, destin seltzer cold and flu) with temporary relief.  No evaluation to date.   Home covid test negative yesterday  No household illnesses  She works in a [employer]        Past Medical History:   Diagnosis Date    Cataract      Social History     Tobacco Use    Smoking status: Former     Current packs/day: 0.00     Average packs/day: 0.5 packs/day for 10.0 years (5.0 ttl pk-yrs)     Types: Cigarettes     Start date: 2008     Quit date: 2018     Years since quittin.9     Passive exposure: Never    Smokeless tobacco: Never   Vaping Use    Vaping Use: Never used   Substance Use Topics    Alcohol use: Not Currently    Drug use: Never       No Known Allergies     Review of Systems  A comprehensive review of systems was negative except for that written in the HPI.    Objective:     /83 (Site: Right Upper Arm, Position: Sitting, Cuff Size: Medium Adult)   Pulse 74   Temp 97.9 °F (36.6 °C) (Temporal)   Resp 18   Ht 1.727 m (5' 8\")   Wt 43.4 kg (95 lb 9.6 oz)   SpO2 97%   BMI 14.54 kg/m²   General:   alert, cooperative   Eyes: conjunctivae/scleras clear. PERRL, EOM's intact   Ears: External auditory canals clear, tympanic membranes clear   Sinuses/Nose: No maxillary or frontal tenderness.  clear rhinorrhea present.   Mouth:  No oral lesions, no pharyngeal erythema, no exudates   Neck: Supple, trachea midline   Heart: S1 and S2 normal,no

## 2024-07-01 ENCOUNTER — TELEPHONE (OUTPATIENT)
Age: 59
End: 2024-07-01

## 2024-07-01 RX ORDER — LEVOFLOXACIN 500 MG/1
500 TABLET, FILM COATED ORAL DAILY
Qty: 7 TABLET | Refills: 0 | Status: SHIPPED | OUTPATIENT
Start: 2024-07-01 | End: 2024-07-08

## 2024-07-01 NOTE — TELEPHONE ENCOUNTER
Pt is calling to get a different rx. Pt stated she does not feel any better    Pt has a fever 100.8, coughing, weakness    Pt was seen on 06/25 with NP Bg Bennett on [facility]

## 2024-07-01 NOTE — TELEPHONE ENCOUNTER
verified. Informed pt of message from provider regarding medication sent. Pt verified understanding and had no further questions.

## 2024-07-01 NOTE — TELEPHONE ENCOUNTER
Reviewed the note from her visit last week.  Given length of illness, lung exam and low-grade fever we will treat presumptively with an antibiotic    Levaquin sent to [facility]

## 2025-02-03 ENCOUNTER — HOSPITAL ENCOUNTER (INPATIENT)
Facility: HOSPITAL | Age: 60
LOS: 3 days | Discharge: HOME | DRG: 189 | End: 2025-02-06
Attending: EMERGENCY MEDICINE | Admitting: INTERNAL MEDICINE
Payer: COMMERCIAL

## 2025-02-03 ENCOUNTER — OFFICE VISIT (OUTPATIENT)
Age: 60
End: 2025-02-03
Payer: COMMERCIAL

## 2025-02-03 ENCOUNTER — APPOINTMENT (OUTPATIENT)
Facility: HOSPITAL | Age: 60
DRG: 189 | End: 2025-02-03
Payer: COMMERCIAL

## 2025-02-03 VITALS
WEIGHT: 89.6 LBS | BODY MASS INDEX: 13.58 KG/M2 | HEART RATE: 113 BPM | SYSTOLIC BLOOD PRESSURE: 116 MMHG | OXYGEN SATURATION: 84 % | RESPIRATION RATE: 17 BRPM | DIASTOLIC BLOOD PRESSURE: 80 MMHG | HEIGHT: 68 IN | TEMPERATURE: 102.3 F

## 2025-02-03 DIAGNOSIS — J18.9 ATYPICAL PNEUMONIA: Primary | ICD-10-CM

## 2025-02-03 DIAGNOSIS — R79.81 LOW OXYGEN SATURATION: ICD-10-CM

## 2025-02-03 DIAGNOSIS — R68.89 FLU-LIKE SYMPTOMS: Primary | ICD-10-CM

## 2025-02-03 DIAGNOSIS — J96.01 ACUTE RESPIRATORY FAILURE WITH HYPOXIA (HCC): ICD-10-CM

## 2025-02-03 PROBLEM — R09.02 HYPOXIA: Status: ACTIVE | Noted: 2025-02-03

## 2025-02-03 LAB
ALBUMIN SERPL-MCNC: 3.6 G/DL (ref 3.5–5)
ALBUMIN/GLOB SERPL: 0.9 (ref 1.1–2.2)
ALP SERPL-CCNC: 107 U/L (ref 45–117)
ALT SERPL-CCNC: 38 U/L (ref 12–78)
ANION GAP SERPL CALC-SCNC: 5 MMOL/L (ref 2–12)
ANION GAP SERPL CALC-SCNC: 6 MMOL/L (ref 2–12)
AST SERPL-CCNC: ABNORMAL U/L (ref 15–37)
BASOPHILS # BLD: 0 K/UL (ref 0–0.1)
BASOPHILS NFR BLD: 0 % (ref 0–1)
BILIRUB SERPL-MCNC: 0.4 MG/DL (ref 0.2–1)
BUN SERPL-MCNC: 22 MG/DL (ref 6–20)
BUN SERPL-MCNC: 23 MG/DL (ref 6–20)
BUN/CREAT SERPL: 24 (ref 12–20)
BUN/CREAT SERPL: 24 (ref 12–20)
CALCIUM SERPL-MCNC: 9.2 MG/DL (ref 8.5–10.1)
CALCIUM SERPL-MCNC: 9.2 MG/DL (ref 8.5–10.1)
CHLORIDE SERPL-SCNC: 100 MMOL/L (ref 97–108)
CHLORIDE SERPL-SCNC: 101 MMOL/L (ref 97–108)
CO2 SERPL-SCNC: 24 MMOL/L (ref 21–32)
CO2 SERPL-SCNC: 25 MMOL/L (ref 21–32)
CREAT SERPL-MCNC: 0.93 MG/DL (ref 0.55–1.02)
CREAT SERPL-MCNC: 0.97 MG/DL (ref 0.55–1.02)
DIFFERENTIAL METHOD BLD: ABNORMAL
EOSINOPHIL # BLD: 0 K/UL (ref 0–0.4)
EOSINOPHIL NFR BLD: 0 % (ref 0–7)
ERYTHROCYTE [DISTWIDTH] IN BLOOD BY AUTOMATED COUNT: 13.1 % (ref 11.5–14.5)
FLUAV RNA SPEC QL NAA+PROBE: NOT DETECTED
FLUBV RNA SPEC QL NAA+PROBE: NOT DETECTED
GLOBULIN SER CALC-MCNC: 4.1 G/DL (ref 2–4)
GLUCOSE SERPL-MCNC: 125 MG/DL (ref 65–100)
GLUCOSE SERPL-MCNC: 126 MG/DL (ref 65–100)
HCT VFR BLD AUTO: 44.1 % (ref 35–47)
HGB BLD-MCNC: 15 G/DL (ref 11.5–16)
IMM GRANULOCYTES # BLD AUTO: 0 K/UL (ref 0–0.04)
IMM GRANULOCYTES NFR BLD AUTO: 0 % (ref 0–0.5)
INFLUENZA A ANTIGEN, POC: NEGATIVE
INFLUENZA B ANTIGEN, POC: NEGATIVE
LACTATE BLD-SCNC: 1.86 MMOL/L (ref 0.4–2)
LYMPHOCYTES # BLD: 0.61 K/UL (ref 0.8–3.5)
LYMPHOCYTES NFR BLD: 6 % (ref 12–49)
MAGNESIUM SERPL-MCNC: NORMAL MG/DL (ref 1.6–2.4)
MCH RBC QN AUTO: 31.2 PG (ref 26–34)
MCHC RBC AUTO-ENTMCNC: 34 G/DL (ref 30–36.5)
MCV RBC AUTO: 91.7 FL (ref 80–99)
MONOCYTES # BLD: 0.51 K/UL (ref 0–1)
MONOCYTES NFR BLD: 5 % (ref 5–13)
NEUTS BAND NFR BLD MANUAL: 9 %
NEUTS SEG # BLD: 9.08 K/UL (ref 1.8–8)
NEUTS SEG NFR BLD: 80 % (ref 32–75)
NRBC # BLD: 0 K/UL (ref 0–0.01)
NRBC BLD-RTO: 0 PER 100 WBC
NT PRO BNP: 54 PG/ML
PHOSPHATE SERPL-MCNC: 3.2 MG/DL (ref 2.6–4.7)
PLATELET # BLD AUTO: 245 K/UL (ref 150–400)
PMV BLD AUTO: 11.4 FL (ref 8.9–12.9)
POTASSIUM SERPL-SCNC: 5.5 MMOL/L (ref 3.5–5.1)
POTASSIUM SERPL-SCNC: ABNORMAL MMOL/L (ref 3.5–5.1)
PROCALCITONIN SERPL-MCNC: 2.16 NG/ML
PROT SERPL-MCNC: 7.7 G/DL (ref 6.4–8.2)
RBC # BLD AUTO: 4.81 M/UL (ref 3.8–5.2)
RBC MORPH BLD: ABNORMAL
SARS-COV-2 RNA RESP QL NAA+PROBE: NOT DETECTED
SODIUM SERPL-SCNC: 130 MMOL/L (ref 136–145)
SODIUM SERPL-SCNC: 131 MMOL/L (ref 136–145)
SOURCE: NORMAL
TROPONIN I SERPL HS-MCNC: 7 NG/L (ref 0–51)
VALID INTERNAL CONTROL, POC: NORMAL
WBC # BLD AUTO: 10.2 K/UL (ref 3.6–11)

## 2025-02-03 PROCEDURE — 83880 ASSAY OF NATRIURETIC PEPTIDE: CPT

## 2025-02-03 PROCEDURE — 2500000003 HC RX 250 WO HCPCS: Performed by: EMERGENCY MEDICINE

## 2025-02-03 PROCEDURE — 2580000003 HC RX 258: Performed by: EMERGENCY MEDICINE

## 2025-02-03 PROCEDURE — 36415 COLL VENOUS BLD VENIPUNCTURE: CPT

## 2025-02-03 PROCEDURE — 93005 ELECTROCARDIOGRAM TRACING: CPT | Performed by: EMERGENCY MEDICINE

## 2025-02-03 PROCEDURE — 84145 PROCALCITONIN (PCT): CPT

## 2025-02-03 PROCEDURE — 83605 ASSAY OF LACTIC ACID: CPT

## 2025-02-03 PROCEDURE — 83735 ASSAY OF MAGNESIUM: CPT

## 2025-02-03 PROCEDURE — 87040 BLOOD CULTURE FOR BACTERIA: CPT

## 2025-02-03 PROCEDURE — 6360000002 HC RX W HCPCS: Performed by: NURSE PRACTITIONER

## 2025-02-03 PROCEDURE — 87636 SARSCOV2 & INF A&B AMP PRB: CPT

## 2025-02-03 PROCEDURE — 6370000000 HC RX 637 (ALT 250 FOR IP): Performed by: NURSE PRACTITIONER

## 2025-02-03 PROCEDURE — 71250 CT THORAX DX C-: CPT

## 2025-02-03 PROCEDURE — 6370000000 HC RX 637 (ALT 250 FOR IP): Performed by: EMERGENCY MEDICINE

## 2025-02-03 PROCEDURE — 84484 ASSAY OF TROPONIN QUANT: CPT

## 2025-02-03 PROCEDURE — 99285 EMERGENCY DEPT VISIT HI MDM: CPT

## 2025-02-03 PROCEDURE — 1100000003 HC PRIVATE W/ TELEMETRY

## 2025-02-03 PROCEDURE — 71046 X-RAY EXAM CHEST 2 VIEWS: CPT

## 2025-02-03 PROCEDURE — 87804 INFLUENZA ASSAY W/OPTIC: CPT

## 2025-02-03 PROCEDURE — 80053 COMPREHEN METABOLIC PANEL: CPT

## 2025-02-03 PROCEDURE — 99214 OFFICE O/P EST MOD 30 MIN: CPT

## 2025-02-03 PROCEDURE — 84100 ASSAY OF PHOSPHORUS: CPT

## 2025-02-03 PROCEDURE — 6360000002 HC RX W HCPCS: Performed by: EMERGENCY MEDICINE

## 2025-02-03 PROCEDURE — 85025 COMPLETE CBC W/AUTO DIFF WBC: CPT

## 2025-02-03 RX ORDER — MAGNESIUM SULFATE IN WATER 40 MG/ML
2000 INJECTION, SOLUTION INTRAVENOUS PRN
Status: DISCONTINUED | OUTPATIENT
Start: 2025-02-03 | End: 2025-02-06 | Stop reason: HOSPADM

## 2025-02-03 RX ORDER — POTASSIUM CHLORIDE 7.45 MG/ML
10 INJECTION INTRAVENOUS PRN
Status: DISCONTINUED | OUTPATIENT
Start: 2025-02-03 | End: 2025-02-06 | Stop reason: HOSPADM

## 2025-02-03 RX ORDER — 0.9 % SODIUM CHLORIDE 0.9 %
1000 INTRAVENOUS SOLUTION INTRAVENOUS ONCE
Status: COMPLETED | OUTPATIENT
Start: 2025-02-03 | End: 2025-02-03

## 2025-02-03 RX ORDER — ACETAMINOPHEN 325 MG/1
650 TABLET ORAL EVERY 4 HOURS PRN
Status: DISCONTINUED | OUTPATIENT
Start: 2025-02-03 | End: 2025-02-06 | Stop reason: HOSPADM

## 2025-02-03 RX ORDER — SODIUM CHLORIDE 0.9 % (FLUSH) 0.9 %
5-40 SYRINGE (ML) INJECTION EVERY 12 HOURS SCHEDULED
Status: DISCONTINUED | OUTPATIENT
Start: 2025-02-03 | End: 2025-02-06 | Stop reason: HOSPADM

## 2025-02-03 RX ORDER — ENOXAPARIN SODIUM 100 MG/ML
30 INJECTION SUBCUTANEOUS DAILY
Status: DISCONTINUED | OUTPATIENT
Start: 2025-02-03 | End: 2025-02-06 | Stop reason: HOSPADM

## 2025-02-03 RX ORDER — HYDROXYZINE HYDROCHLORIDE 25 MG/1
25 TABLET, FILM COATED ORAL EVERY 6 HOURS PRN
Status: DISCONTINUED | OUTPATIENT
Start: 2025-02-03 | End: 2025-02-06 | Stop reason: HOSPADM

## 2025-02-03 RX ORDER — SODIUM CHLORIDE 0.9 % (FLUSH) 0.9 %
5-40 SYRINGE (ML) INJECTION PRN
Status: DISCONTINUED | OUTPATIENT
Start: 2025-02-03 | End: 2025-02-06 | Stop reason: HOSPADM

## 2025-02-03 RX ORDER — ACETAMINOPHEN 500 MG
1000 TABLET ORAL
Status: COMPLETED | OUTPATIENT
Start: 2025-02-03 | End: 2025-02-03

## 2025-02-03 RX ORDER — ACETAMINOPHEN 650 MG/1
650 SUPPOSITORY RECTAL EVERY 6 HOURS PRN
Status: DISCONTINUED | OUTPATIENT
Start: 2025-02-03 | End: 2025-02-06 | Stop reason: HOSPADM

## 2025-02-03 RX ORDER — TRAMADOL HYDROCHLORIDE 50 MG/1
50 TABLET ORAL EVERY 6 HOURS PRN
Status: DISCONTINUED | OUTPATIENT
Start: 2025-02-03 | End: 2025-02-06 | Stop reason: HOSPADM

## 2025-02-03 RX ORDER — ONDANSETRON 2 MG/ML
4 INJECTION INTRAMUSCULAR; INTRAVENOUS EVERY 6 HOURS PRN
Status: DISCONTINUED | OUTPATIENT
Start: 2025-02-03 | End: 2025-02-06 | Stop reason: HOSPADM

## 2025-02-03 RX ORDER — ALBUTEROL SULFATE 0.83 MG/ML
2.5 SOLUTION RESPIRATORY (INHALATION)
Status: COMPLETED | OUTPATIENT
Start: 2025-02-03 | End: 2025-02-03

## 2025-02-03 RX ORDER — POLYETHYLENE GLYCOL 3350 17 G/17G
17 POWDER, FOR SOLUTION ORAL DAILY PRN
Status: DISCONTINUED | OUTPATIENT
Start: 2025-02-03 | End: 2025-02-06 | Stop reason: HOSPADM

## 2025-02-03 RX ORDER — ONDANSETRON 4 MG/1
4 TABLET, ORALLY DISINTEGRATING ORAL EVERY 8 HOURS PRN
Status: DISCONTINUED | OUTPATIENT
Start: 2025-02-03 | End: 2025-02-06 | Stop reason: HOSPADM

## 2025-02-03 RX ORDER — SODIUM CHLORIDE 9 MG/ML
INJECTION, SOLUTION INTRAVENOUS PRN
Status: DISCONTINUED | OUTPATIENT
Start: 2025-02-03 | End: 2025-02-06 | Stop reason: HOSPADM

## 2025-02-03 RX ORDER — POTASSIUM CHLORIDE 1500 MG/1
40 TABLET, EXTENDED RELEASE ORAL PRN
Status: DISCONTINUED | OUTPATIENT
Start: 2025-02-03 | End: 2025-02-06 | Stop reason: HOSPADM

## 2025-02-03 RX ORDER — IPRATROPIUM BROMIDE AND ALBUTEROL SULFATE 2.5; .5 MG/3ML; MG/3ML
1 SOLUTION RESPIRATORY (INHALATION)
Status: DISCONTINUED | OUTPATIENT
Start: 2025-02-03 | End: 2025-02-04

## 2025-02-03 RX ORDER — CALCIUM CARBONATE 500 MG/1
500 TABLET, CHEWABLE ORAL 3 TIMES DAILY PRN
Status: DISCONTINUED | OUTPATIENT
Start: 2025-02-03 | End: 2025-02-06 | Stop reason: HOSPADM

## 2025-02-03 RX ORDER — ACETAMINOPHEN 325 MG/1
650 TABLET ORAL EVERY 6 HOURS PRN
Status: DISCONTINUED | OUTPATIENT
Start: 2025-02-03 | End: 2025-02-06 | Stop reason: HOSPADM

## 2025-02-03 RX ADMIN — ENOXAPARIN SODIUM 30 MG: 100 INJECTION SUBCUTANEOUS at 21:16

## 2025-02-03 RX ADMIN — WATER 1000 MG: 1 INJECTION INTRAMUSCULAR; INTRAVENOUS; SUBCUTANEOUS at 18:42

## 2025-02-03 RX ADMIN — ACETAMINOPHEN 1000 MG: 500 TABLET, FILM COATED ORAL at 17:07

## 2025-02-03 RX ADMIN — ALBUTEROL SULFATE 2.5 MG: 2.5 SOLUTION RESPIRATORY (INHALATION) at 18:45

## 2025-02-03 RX ADMIN — SODIUM CHLORIDE 1000 ML: 9 INJECTION, SOLUTION INTRAVENOUS at 17:07

## 2025-02-03 RX ADMIN — SODIUM CHLORIDE 1000 ML: 9 INJECTION, SOLUTION INTRAVENOUS at 18:41

## 2025-02-03 RX ADMIN — AZITHROMYCIN MONOHYDRATE 500 MG: 500 INJECTION, POWDER, LYOPHILIZED, FOR SOLUTION INTRAVENOUS at 20:22

## 2025-02-03 RX ADMIN — CALCIUM CARBONATE 500 MG: 500 TABLET, CHEWABLE ORAL at 20:17

## 2025-02-03 SDOH — ECONOMIC STABILITY: FOOD INSECURITY: WITHIN THE PAST 12 MONTHS, THE FOOD YOU BOUGHT JUST DIDN'T LAST AND YOU DIDN'T HAVE MONEY TO GET MORE.: NEVER TRUE

## 2025-02-03 SDOH — ECONOMIC STABILITY: FOOD INSECURITY: WITHIN THE PAST 12 MONTHS, YOU WORRIED THAT YOUR FOOD WOULD RUN OUT BEFORE YOU GOT MONEY TO BUY MORE.: NEVER TRUE

## 2025-02-03 ASSESSMENT — PATIENT HEALTH QUESTIONNAIRE - PHQ9
SUM OF ALL RESPONSES TO PHQ QUESTIONS 1-9: 0
1. LITTLE INTEREST OR PLEASURE IN DOING THINGS: NOT AT ALL
2. FEELING DOWN, DEPRESSED OR HOPELESS: NOT AT ALL
SUM OF ALL RESPONSES TO PHQ QUESTIONS 1-9: 0
SUM OF ALL RESPONSES TO PHQ QUESTIONS 1-9: 0
SUM OF ALL RESPONSES TO PHQ9 QUESTIONS 1 & 2: 0
SUM OF ALL RESPONSES TO PHQ QUESTIONS 1-9: 0

## 2025-02-03 ASSESSMENT — PAIN SCALES - GENERAL: PAINLEVEL_OUTOF10: 0

## 2025-02-03 NOTE — H&P
Hospitalist Admission Note    NAME:   Aarti Traylor   : 1965   MRN: [id number]     Date/Time: 2/3/2025 6:06 PM    Patient PCP: Loren Rivas, FNP    ______________________________________________________________________  Given the patient's current clinical presentation, I have a high level of concern for decompensation if discharged from the emergency department.  Complex decision making was performed, which includes reviewing the patient's available past medical records, laboratory results, and x-ray films.       My assessment of this patient's clinical condition and my plan of care is as follows.    Assessment / Plan:    Admit to medicine      SIRS temp 100.2   Cough, fever, congestion cold-like symptoms  Nausea vomiting diarrhea  Acute hypoxia sat 84 with 86%    CXR    IMPRESSION:  Hyperinflated lungs. Probable biapical emphysematous changes. No  focal airspace process.  -Rocephin azithromycin started in the ED-will resume  -Influenza A/ B COVID-negative  - obtain CT chest   - Lactic 1.86  - pro jose 2.16- NS bolus in ED   -obtain UA C&S    -Obtain paired blood culture  -Hematology unremarkable  -obtain stool sample enteric bac/ C diff   -Chemistry hyponatremia sodium 130- repeat stat 2/2 hemolysis   -Sputum culture  - obtain pro BNP   -Nebs as needed  - consult pulmonary     History of COPD  -Patient states does not take albuterol or any other inhalers  Patient admits still  occasionally smoke  - oxygen protocol  -Nebs as needed  - smoking cessation counseling       Medical Decision Making:   I personally reviewed labs: y  I personally reviewed imaging:y  I personally reviewed EKG:y SR  Toxic drug monitoring: y  Discussed case with: ED provider. After discussion I am in agreement that acuity of patient's medical condition necessitates hospital stay.      Code Status: Full code  DVT Prophylaxis: Lovenox  Baseline: Lives independently with     Subjective:   CHIEF COMPLAINT: I Started  Phosphatase 107 45 - 117 U/L    Total Protein 7.7 6.4 - 8.2 g/dL    Albumin 3.6 3.5 - 5.0 g/dL    Globulin 4.1 (H) 2.0 - 4.0 g/dL    Albumin/Globulin Ratio 0.9 (L) 1.1 - 2.2     Magnesium    Collection Time: 02/03/25  4:37 PM   Result Value Ref Range    Magnesium Hemolyzed, Recollection Recommended 1.6 - 2.4 mg/dL   Troponin “IF” patient is greater than 40 years of age or has a history of cardiac disease.    Collection Time: 02/03/25  4:37 PM   Result Value Ref Range    Troponin, High Sensitivity 7 0 - 51 ng/L   COVID-19 & Influenza Combo    Collection Time: 02/03/25  4:37 PM    Specimen: Nasopharyngeal   Result Value Ref Range    Source Nasopharyngeal      SARS-CoV-2, PCR Not detected NOTD      Rapid Influenza A By PCR Not detected NOTD      Rapid Influenza B By PCR Not detected NOTD     Procalcitonin    Collection Time: 02/03/25  4:37 PM   Result Value Ref Range    Procalcitonin 2.16 ng/mL   POC Lactic Acid    Collection Time: 02/03/25  4:38 PM   Result Value Ref Range    POC Lactic Acid 1.86 0.40 - 2.00 mmol/L         XR CHEST (2 VW)    Result Date: 2/3/2025  Indication:  SOB Exam: PA and lateral views of the chest. Direct comparison is made to prior CXR dated November 2009. Findings: Cardiomediastinal silhouette is within normal limits. The lungs are hyperinflated there are probable biapical emphysematous changes. There is no focal airspace process. There is no pleural fluid. Osseous structures are diffusely demineralized, but intact.     Hyperinflated lungs. Probable biapical emphysematous changes. No focal airspace process. Electronically signed by Jak Delgado MD     _______________________________________________________________________    TOTAL TIME:  76 Minutes    Critical Care Provided     Minutes non procedure based    Signed: ALBA Hector    Procedures: see electronic medical records for all procedures/Xrays and details which were not copied into this note but were reviewed prior to creation of

## 2025-02-03 NOTE — PATIENT INSTRUCTIONS
Please go to the Emergency Department due to hypoxia. Your oxygen saturation was 84-86% on room air. It did improve when you were placed on 4L of oxygen via nasal cannula.    Rpd

## 2025-02-03 NOTE — PROGRESS NOTES
The patient identity was confirmed with  and First/Last Name. Medications and Allergies reviewed with patient, as well as any new diagnosis/procedures. Depression Screening and SDOH Screening done today.    Chief Complaint   Patient presents with    Chills    Fever    Generalized Body Aches     Symptoms started Thursday around lunchtime.        Vitals:    25 1403   BP: 116/80   Pulse: (!) 113   Resp: 17   Temp: (!) 102.3 °F (39.1 °C)   SpO2: (!) 84%       Health Maintenance Due   Topic Date Due    HIV screen  Never done    DTaP/Tdap/Td vaccine (1 - Tdap) Never done    Colorectal Cancer Screen  Never done    Shingles vaccine (1 of 2) Never done    Flu vaccine (1) Never done    COVID-19 Vaccine ( season) Never done          \"Have you been to the ER, urgent care clinic since your last visit?  Hospitalized since your last visit?\"    NO    “Have you seen or consulted any other health care providers outside our system since your last visit?”    NO      “Have you had a colorectal cancer screening such as a colonoscopy/FIT/Cologuard?    NO    No colonoscopy on file  No cologuard on file  No FIT/FOBT on file   No flexible sigmoidoscopy on file

## 2025-02-03 NOTE — PROGRESS NOTES
CC:  Chief Complaint   Patient presents with    Chills    Fever    Generalized Body Aches     Symptoms started Thursday around lunchtime.       HPI:  Aarti Traylor is a 59 y.o. year old female who presents to the clinic for evaluation.    She reports body aches, fever/chills, sore throat, congestion that started on Thursday. No known sick contacts. Denies chest pain, shortness of breath. She has been taking tylenol and mucinex cold/flu to treat her symptoms with temporary relief.    She is hypoxic in the office with room air saturation at 84-86%. She was placed on 4L of oxygen via nasal cannual with improvements to 91-93%. Denies shortness of breath, chest pain. Denies history of COPD. Does admit to smoking occasionally.      Reviewed PmHx, RxHx, FmHx, SocHx, AllgHx and updated in chart.    ROS:  As per HPI.         Vitals:    02/03/25 1403   BP: 116/80   Site: Left Upper Arm   Position: Sitting   Cuff Size: Medium Adult   Pulse: (!) 113   Resp: 17   Temp: (!) 102.3 °F (39.1 °C)   TempSrc: Oral   SpO2: (!) 84%   Weight: 40.6 kg (89 lb 9.6 oz)   Height: 1.727 m (5' 8\")       PHYSICAL EXAM:  General:  Alert and oriented x 3. No acute distress  Head:  Normocephalic. Atraumatic  Eyes:  Pupils PERRLA. EOMs intact. Conjunctiva clear  ENT:  Oropharynx without erythema. Exudates not present. Tongue in midline  Neck/Thyroid:  Neck supple. Full range of motion. Trachea midline  Cardiovascular:  Regular rate and rhythm. No murmurs, rubs or gallops. 2+ distal pulses. No lower extremity edema.  Pulmonary:  Expiratory wheeze throughout. No rhonchi.  Abdominal:  Soft, nontender, nondistended.   Skin:  Warm and dry. No rash or suspicious lesions on exposed skin.  Neurological:  Nonfocal. 5/5 strength throughout. Good eye contact. Mood/affect full range. Speech clear. Cooperative with exam.       ASSESSMENT/PLAN:    1. Flu-like symptoms  -     AMB POC NERISSA INFLUENZA A/B TEST  2. Low oxygen saturation  Comments:  85% on room

## 2025-02-04 LAB
ANION GAP SERPL CALC-SCNC: 6 MMOL/L (ref 2–12)
APPEARANCE UR: CLEAR
BACTERIA URNS QL MICRO: NEGATIVE /HPF
BASOPHILS # BLD: 0.01 K/UL (ref 0–0.1)
BASOPHILS NFR BLD: 0.1 % (ref 0–1)
BILIRUB UR QL: NEGATIVE
BUN SERPL-MCNC: 13 MG/DL (ref 6–20)
BUN/CREAT SERPL: 27 (ref 12–20)
CALCIUM SERPL-MCNC: 8.5 MG/DL (ref 8.5–10.1)
CHLORIDE SERPL-SCNC: 108 MMOL/L (ref 97–108)
CO2 SERPL-SCNC: 22 MMOL/L (ref 21–32)
COLOR UR: ABNORMAL
CREAT SERPL-MCNC: 0.49 MG/DL (ref 0.55–1.02)
DIFFERENTIAL METHOD BLD: ABNORMAL
EKG ATRIAL RATE: 101 BPM
EKG DIAGNOSIS: NORMAL
EKG P AXIS: 88 DEGREES
EKG P-R INTERVAL: 122 MS
EKG Q-T INTERVAL: 342 MS
EKG QRS DURATION: 80 MS
EKG QTC CALCULATION (BAZETT): 443 MS
EKG R AXIS: -49 DEGREES
EKG T AXIS: 77 DEGREES
EKG VENTRICULAR RATE: 101 BPM
EOSINOPHIL # BLD: 0 K/UL (ref 0–0.4)
EOSINOPHIL NFR BLD: 0 % (ref 0–7)
EPITH CASTS URNS QL MICRO: ABNORMAL /LPF
ERYTHROCYTE [DISTWIDTH] IN BLOOD BY AUTOMATED COUNT: 13.2 % (ref 11.5–14.5)
GLUCOSE SERPL-MCNC: 85 MG/DL (ref 65–100)
GLUCOSE UR STRIP.AUTO-MCNC: NEGATIVE MG/DL
HCT VFR BLD AUTO: 37 % (ref 35–47)
HGB BLD-MCNC: 12.4 G/DL (ref 11.5–16)
HGB UR QL STRIP: NEGATIVE
HYALINE CASTS URNS QL MICRO: ABNORMAL /LPF (ref 0–2)
IMM GRANULOCYTES # BLD AUTO: 0.05 K/UL (ref 0–0.04)
IMM GRANULOCYTES NFR BLD AUTO: 0.4 % (ref 0–0.5)
KETONES UR QL STRIP.AUTO: 15 MG/DL
LEUKOCYTE ESTERASE UR QL STRIP.AUTO: NEGATIVE
LYMPHOCYTES # BLD: 1.92 K/UL (ref 0.8–3.5)
LYMPHOCYTES NFR BLD: 17.2 % (ref 12–49)
MCH RBC QN AUTO: 31.2 PG (ref 26–34)
MCHC RBC AUTO-ENTMCNC: 33.5 G/DL (ref 30–36.5)
MCV RBC AUTO: 93 FL (ref 80–99)
MONOCYTES # BLD: 1.16 K/UL (ref 0–1)
MONOCYTES NFR BLD: 10.4 % (ref 5–13)
NEUTS SEG # BLD: 8 K/UL (ref 1.8–8)
NEUTS SEG NFR BLD: 71.9 % (ref 32–75)
NITRITE UR QL STRIP.AUTO: NEGATIVE
NRBC # BLD: 0 K/UL (ref 0–0.01)
NRBC BLD-RTO: 0 PER 100 WBC
PH UR STRIP: 6 (ref 5–8)
PLATELET # BLD AUTO: 184 K/UL (ref 150–400)
PMV BLD AUTO: 10.2 FL (ref 8.9–12.9)
POTASSIUM SERPL-SCNC: 4 MMOL/L (ref 3.5–5.1)
PROT UR STRIP-MCNC: ABNORMAL MG/DL
RBC # BLD AUTO: 3.98 M/UL (ref 3.8–5.2)
RBC #/AREA URNS HPF: ABNORMAL /HPF (ref 0–5)
SODIUM SERPL-SCNC: 136 MMOL/L (ref 136–145)
SP GR UR REFRACTOMETRY: 1.02
T4 FREE SERPL-MCNC: 1.1 NG/DL (ref 0.8–1.5)
TSH SERPL DL<=0.05 MIU/L-ACNC: 0.66 UIU/ML (ref 0.36–3.74)
URINE CULTURE IF INDICATED: ABNORMAL
UROBILINOGEN UR QL STRIP.AUTO: 0.2 EU/DL (ref 0.2–1)
VIT B12 SERPL-MCNC: 1275 PG/ML (ref 193–986)
WBC # BLD AUTO: 11.1 K/UL (ref 3.6–11)
WBC URNS QL MICRO: ABNORMAL /HPF (ref 0–4)

## 2025-02-04 PROCEDURE — 85025 COMPLETE CBC W/AUTO DIFF WBC: CPT

## 2025-02-04 PROCEDURE — 80048 BASIC METABOLIC PNL TOTAL CA: CPT

## 2025-02-04 PROCEDURE — 94640 AIRWAY INHALATION TREATMENT: CPT

## 2025-02-04 PROCEDURE — 6370000000 HC RX 637 (ALT 250 FOR IP): Performed by: EMERGENCY MEDICINE

## 2025-02-04 PROCEDURE — 6360000002 HC RX W HCPCS: Performed by: NURSE PRACTITIONER

## 2025-02-04 PROCEDURE — 2500000003 HC RX 250 WO HCPCS: Performed by: NURSE PRACTITIONER

## 2025-02-04 PROCEDURE — 6370000000 HC RX 637 (ALT 250 FOR IP): Performed by: INTERNAL MEDICINE

## 2025-02-04 PROCEDURE — 81001 URINALYSIS AUTO W/SCOPE: CPT

## 2025-02-04 PROCEDURE — 82306 VITAMIN D 25 HYDROXY: CPT

## 2025-02-04 PROCEDURE — 82607 VITAMIN B-12: CPT

## 2025-02-04 PROCEDURE — 36415 COLL VENOUS BLD VENIPUNCTURE: CPT

## 2025-02-04 PROCEDURE — 84439 ASSAY OF FREE THYROXINE: CPT

## 2025-02-04 PROCEDURE — 2700000000 HC OXYGEN THERAPY PER DAY

## 2025-02-04 PROCEDURE — 84443 ASSAY THYROID STIM HORMONE: CPT

## 2025-02-04 PROCEDURE — 6370000000 HC RX 637 (ALT 250 FOR IP): Performed by: NURSE PRACTITIONER

## 2025-02-04 PROCEDURE — 1100000003 HC PRIVATE W/ TELEMETRY

## 2025-02-04 RX ORDER — AZITHROMYCIN 250 MG/1
500 TABLET, FILM COATED ORAL DAILY
Status: COMPLETED | OUTPATIENT
Start: 2025-02-04 | End: 2025-02-05

## 2025-02-04 RX ORDER — ALBUTEROL SULFATE 0.83 MG/ML
2.5 SOLUTION RESPIRATORY (INHALATION) EVERY 6 HOURS PRN
Status: DISCONTINUED | OUTPATIENT
Start: 2025-02-04 | End: 2025-02-06 | Stop reason: HOSPADM

## 2025-02-04 RX ORDER — GUAIFENESIN 600 MG/1
600 TABLET, EXTENDED RELEASE ORAL 2 TIMES DAILY
Status: DISCONTINUED | OUTPATIENT
Start: 2025-02-04 | End: 2025-02-06 | Stop reason: HOSPADM

## 2025-02-04 RX ORDER — IPRATROPIUM BROMIDE AND ALBUTEROL SULFATE 2.5; .5 MG/3ML; MG/3ML
1 SOLUTION RESPIRATORY (INHALATION)
Status: DISCONTINUED | OUTPATIENT
Start: 2025-02-04 | End: 2025-02-05

## 2025-02-04 RX ADMIN — SODIUM CHLORIDE, PRESERVATIVE FREE 10 ML: 5 INJECTION INTRAVENOUS at 14:21

## 2025-02-04 RX ADMIN — ENOXAPARIN SODIUM 30 MG: 100 INJECTION SUBCUTANEOUS at 10:43

## 2025-02-04 RX ADMIN — ACETAMINOPHEN 650 MG: 325 TABLET ORAL at 04:46

## 2025-02-04 RX ADMIN — ARFORMOTEROL TARTRATE: 15 SOLUTION RESPIRATORY (INHALATION) at 21:12

## 2025-02-04 RX ADMIN — IPRATROPIUM BROMIDE AND ALBUTEROL SULFATE 1 DOSE: .5; 3 SOLUTION RESPIRATORY (INHALATION) at 21:12

## 2025-02-04 RX ADMIN — ARFORMOTEROL TARTRATE: 15 SOLUTION RESPIRATORY (INHALATION) at 00:51

## 2025-02-04 RX ADMIN — SODIUM CHLORIDE, PRESERVATIVE FREE 10 ML: 5 INJECTION INTRAVENOUS at 20:35

## 2025-02-04 RX ADMIN — WATER 1000 MG: 1 INJECTION INTRAMUSCULAR; INTRAVENOUS; SUBCUTANEOUS at 20:34

## 2025-02-04 RX ADMIN — ARFORMOTEROL TARTRATE: 15 SOLUTION RESPIRATORY (INHALATION) at 08:21

## 2025-02-04 RX ADMIN — IPRATROPIUM BROMIDE AND ALBUTEROL SULFATE 1 DOSE: .5; 3 SOLUTION RESPIRATORY (INHALATION) at 00:51

## 2025-02-04 RX ADMIN — AZITHROMYCIN 500 MG: 250 TABLET, FILM COATED ORAL at 11:45

## 2025-02-04 RX ADMIN — IPRATROPIUM BROMIDE AND ALBUTEROL SULFATE 1 DOSE: .5; 3 SOLUTION RESPIRATORY (INHALATION) at 08:24

## 2025-02-04 RX ADMIN — IPRATROPIUM BROMIDE AND ALBUTEROL SULFATE 1 DOSE: .5; 3 SOLUTION RESPIRATORY (INHALATION) at 13:00

## 2025-02-04 RX ADMIN — GUAIFENESIN 600 MG: 600 TABLET ORAL at 20:34

## 2025-02-04 RX ADMIN — SODIUM CHLORIDE, PRESERVATIVE FREE 10 ML: 5 INJECTION INTRAVENOUS at 00:54

## 2025-02-04 RX ADMIN — IPRATROPIUM BROMIDE AND ALBUTEROL SULFATE 1 DOSE: .5; 3 SOLUTION RESPIRATORY (INHALATION) at 14:52

## 2025-02-04 ASSESSMENT — PAIN SCALES - GENERAL: PAINLEVEL_OUTOF10: 4

## 2025-02-04 NOTE — ED NOTES
Bedside and Verbal shift change report given to Beba (oncoming nurse) by Jojo (offgoing nurse). Report included the following information Nurse Handoff Report, Index, ED Encounter Summary, ED SBAR, Adult Overview, MAR, and Neuro Assessment.

## 2025-02-04 NOTE — PLAN OF CARE
Problem: Respiratory - Adult  Goal: Achieves optimal ventilation and oxygenation  2/4/2025 1446 by Betty Carbajal RN  Outcome: Progressing  2/4/2025 0826 by Kendra Kennedy RCP  Outcome: Progressing     Problem: Safety - Adult  Goal: Free from fall injury  Outcome: Progressing     Problem: Discharge Planning  Goal: Discharge to home or other facility with appropriate resources  Outcome: Progressing

## 2025-02-04 NOTE — ED NOTES
Sandee Stone, ZABRINA updated about patient's hypotension, she assessed patient, no further orders at this time.

## 2025-02-04 NOTE — CONSULTS
Consult received. Full consult note to follow.      Tram Calhoun MD  Pulmonology  [place], [place]  [phone]  2/4/2025

## 2025-02-04 NOTE — ED NOTES
Bedside and Verbal shift change report given to Kendrick (oncoming nurse) by Beba (offgoing nurse). Report included the following information Nurse Handoff Report, Index, Intake/Output, and MAR.     [Alert] : alert [No Acute Distress] : no acute distress [EOMI Bilateral] : EOMI bilateral [Normocephalic] : normocephalic [Pink Nasal Mucosa] : pink nasal mucosa [Clear tympanic membranes with bony landmarks and light reflex present bilaterally] : clear tympanic membranes with bony landmarks and light reflex present bilaterally  [Nonerythematous Oropharynx] : nonerythematous oropharynx [Supple, full passive range of motion] : supple, full passive range of motion [No Palpable Masses] : no palpable masses [Clear to Ausculatation Bilaterally] : clear to auscultation bilaterally [Regular Rate and Rhythm] : regular rate and rhythm [Normal S1, S2 audible] : normal S1, S2 audible [+2 Femoral Pulses] : +2 femoral pulses [No Murmurs] : no murmurs [Soft] : soft [NonTender] : non tender [Non Distended] : non distended [Normoactive Bowel Sounds] : normoactive bowel sounds [No Hepatomegaly] : no hepatomegaly [Chencho: ____] : Chencho [unfilled] [Chencho: _____] : Chencho [unfilled] [No Splenomegaly] : no splenomegaly [Normal Muscle Tone] : normal muscle tone [No Abnormal Lymph Nodes Palpated] : no abnormal lymph nodes palpated [No Gait Asymmetry] : no gait asymmetry [Straight] : straight [No pain or deformities with palpation of bone, muscles, joints] : no pain or deformities with palpation of bone, muscles, joints [+2 Patella DTR] : +2 patella DTR [Cranial Nerves Grossly Intact] : cranial nerves grossly intact [No Rash or Lesions] : no rash or lesions

## 2025-02-04 NOTE — PROGRESS NOTES
Hospitalist Progress Note    NAME:   Aarti Traylor   : 1965   MRN: [id number]     Date/Time: 2025 2:53 PM  Patient PCP: Loren Rivas FNP    Estimated discharge date: 24-48 hrs, home  Barriers: Pulm consult/clearance, Oxygen tapered off      Assessment / Plan:        SIRS POA- resolved  Flu syndrome POA-Cough, fever, congestion cold-like symptoms  Nausea vomiting diarrhea POA- resolved  Acute Resp failure with hypoxia POA- was not on oxygen at home till now  CXR :Hyperinflated lungs. Probable biapical emphysematous changes. No focal airspace process.  Influenza A/ B COVID-negative  CT chest: Prominent emphysema, small interstitial infiltrates bilaterally of unknown age, could be chronic  - Lactic 1.86  - pro jose 2.16  - proBNP 54  - UA neg  - initial Blood Cx neg x 24 hrs    Cont empiric Abx for now-Rocephin + azithromycin   S/p IVF NS bolus in ED, pt eating well- avoid further IVF  IP Pulm consulted- await further recc     History of COPD but had not seen Lung doctor in years  -Patient states does not take albuterol or any other inhalers  Patient admits still  occasionally smoke  - oxygen protocol- taper off as able in next 24 hrs  Cont Nebs   Will add IV solumedrol today  - smoking cessation counseling given again today in ER- declined nicotine patch offered today  IP Pulm consulted - will need PFTs as outpatient        Medical Decision Making:   I personally reviewed labs: y  I personally reviewed imaging:y  I personally reviewed EKG:y   Toxic drug monitoring: y  Discussed case with: Pt, RN in ER        Code Status: Full code  DVT Prophylaxis: Lovenox  Baseline: Lives independently with     Subjective:     Chief Complaint / Reason for Physician Visit:  F/U for COPD exacerbation, ARF with hypoxia, Smoker  \"I am ok\".  Discussed with RN events overnight.     Pt complaining of cough with wheezing but no new complains  Remains on 4L/m NC for hypoxia of 86% on RA in ER  yesterday      Objective:     VITALS:   Last 24hrs VS reviewed since prior progress note. Most recent are:  Patient Vitals for the past 24 hrs:   BP Temp Temp src Pulse Resp SpO2 Height Weight   02/04/25 1452 -- -- -- -- -- 100 % -- --   02/04/25 1420 97/64 97.9 °F (36.6 °C) Oral 79 19 100 % -- --   02/04/25 1300 -- -- -- -- -- 97 % -- --   02/04/25 1115 98/63 98.6 °F (37 °C) Oral 88 19 98 % -- --   02/04/25 0821 -- -- -- -- -- 97 % -- --   02/04/25 0700 98/65 98.6 °F (37 °C) Oral 78 19 98 % -- --   02/04/25 0630 (!) 97/59 -- -- -- -- 96 % -- --   02/04/25 0545 (!) 96/54 -- -- 75 18 95 % -- --   02/04/25 0500 -- -- -- -- -- 94 % -- --   02/04/25 0451 (!) 90/56 -- -- -- -- 91 % -- --   02/04/25 0450 -- -- -- -- -- 90 % -- --   02/04/25 0448 -- -- -- -- -- (!) 88 % -- --   02/04/25 0447 -- -- -- -- -- (!) 87 % -- --   02/04/25 0446 -- -- -- -- -- (!) 86 % -- --   02/04/25 0130 (!) 98/59 99.2 °F (37.3 °C) Oral -- -- 93 % -- --   02/04/25 0054 -- -- -- -- -- 94 % -- --   02/03/25 2300 (!) 90/58 -- -- -- -- -- -- --   02/03/25 2041 92/64 -- -- -- -- -- -- --   02/03/25 2015 (!) 93/53 -- -- -- -- -- -- --   02/03/25 1915 (!) 84/47 -- -- -- -- -- -- --   02/03/25 1845 (!) 80/52 -- -- -- -- 92 % -- --   02/03/25 1700 106/74 -- -- 90 -- 98 % -- --   02/03/25 1602 97/69 100.2 °F (37.9 °C) -- (!) 113 24 (!) 88 % -- --   02/03/25 1545 -- -- -- -- -- -- 1.727 m (5' 8\") 40.3 kg (88 lb 13.5 oz)         Intake/Output Summary (Last 24 hours) at 2/4/2025 1453  Last data filed at 2/3/2025 2122  Gross per 24 hour   Intake 2237.23 ml   Output --   Net 2237.23 ml        I had a face to face encounter and independently examined this patient on 2/4/2025, as outlined below:  PHYSICAL EXAM:  General: Alert, cooperative  EENT:  EOMI. Anicteric sclerae.  Resp:  Distant BS+, mild expiratory wheezing +.  No accessory muscle use  CV:  Regular  rhythm,  No edema  GI:  Soft, Non distended, Non tender.  +Bowel sounds  Neurologic:  Alert and oriented

## 2025-02-04 NOTE — CONSULTS
Pulmonary Consult Note            Name: Aarti Traylor   : 1965   MRN: [id number]   Date: 2025      Reason for Consult: Acute hypoxia and COPD    Requesting Provider: Ms Sandee Stone of hospitalist team    Mode of visit - In person       HPI:     Aarti Traylor is 59 y.o. lady with history of COPD admitted yesterday with SOB and hypoxia.  Patient apparently saw her PCP with fever chills diarrhea and poor appetite for last few days. O2 sats there were in mid 80s leading to ER visit.    Patient reports mild cough with some sputum.  No prior history of low oxygen levels.  Mild chronic exertional dyspnea.  Attributes it to weight loss.  Reports an unintentional weight loss of 50 pounds in the last couple of years.  Has albuterol inhaler at home, does not really use it.  Has never seen pulmonology.  Has about 35-pack-year smoking history, smoked a pack a day until age 48, currently smokes only when stressed.  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    Patient was febrile with a temp of 102.2 °F and tachycardic at arrival to ID.  She is hemodynamically stable.  Currently on 3 L oxygen.  She is on Rocephin and azithromycin, Brovana, Pulmicort and DuoNeb nebulizations here.    Labs are remarkable for mild leukocytosis, procalcitonin > 2.  Chemistry, LFT and proBNP normal.    Flu and COVID swabs negative.  Blood cultures pending.    admission chest x-ray showed hyperinflated lungs with no acute findings.    Review of chest CT images show moderate to severe upper lobe emphysema, some tree-in-bud nodularity in left lower lobe and inferior aspect of RUL.    Assessment/Plan:     1.  Acute hypoxic respiratory failure from # 2&3.  2.  COPD exacerbation  3.  Sepsis with community-acquired pneumonia  4.  Chronic tobacco smoking  5.  Unintentional weight loss    Management plan :    Acute hypoxia improving, on 2 L oxygen, wean gradually as tolerated  Obtain sputum cultures and  traMADol (ULTRAM) tablet 50 mg  50 mg Oral Q6H PRN Sandee Stone ACNP        arformoterol 15 mcg-budesonide 0.25 mg neb solution   Nebulization BID RT Stone ALBA Alfaro   Given at 25 0821     No current outpatient medications on file.       Home Medications:     Prior to Admission medications    Not on File       Allergies/Social/Family History:     No Known Allergies   Social History     Tobacco Use    Smoking status: Former     Current packs/day: 0.00     Average packs/day: 0.5 packs/day for 10.0 years (5.0 ttl pk-yrs)     Types: Cigarettes     Start date: 2008     Quit date: 2018     Years since quittin.6     Passive exposure: Never    Smokeless tobacco: Never   Substance Use Topics    Alcohol use: Not Currently      Family History   Problem Relation Age of Onset    Cancer Father     No Known Problems Mother          LABS AND  DATA :  Reviewed    Recent Results (from the past 24 hour(s))   AMB POC NERISSA INFLUENZA A/B TEST    Collection Time: 25  2:02 PM   Result Value Ref Range    Valid Internal Control, POC valid     Influenza A Antigen, POC Negative Not Detected    Influenza B Antigen, POC Negative Not Detected   EKG 12 Lead    Collection Time: 25  4:14 PM   Result Value Ref Range    Ventricular Rate 101 BPM    Atrial Rate 101 BPM    P-R Interval 122 ms    QRS Duration 80 ms    Q-T Interval 342 ms    QTc Calculation (Bazett) 443 ms    P Axis 88 degrees    R Axis -49 degrees    T Axis 77 degrees    Diagnosis       Sinus tachycardia  Left axis deviation  Abnormal ECG  When compared with ECG of 2009 10:33,  Vent. rate has increased BY  42 BPM  QRS axis shifted left     CBC with Auto Differential    Collection Time: 25  4:37 PM   Result Value Ref Range    WBC 10.2 3.6 - 11.0 K/uL    RBC 4.81 3.80 - 5.20 M/uL    Hemoglobin 15.0 11.5 - 16.0 g/dL    Hematocrit 44.1 35.0 - 47.0 %    MCV 91.7 80.0 - 99.0 FL    MCH 31.2 26.0 - 34.0 PG    MCHC 34.0 30.0 - 36.5 g/dL    RDW  13.1 11.5 - 14.5 %    Platelets 245 150 - 400 K/uL    MPV 11.4 8.9 - 12.9 FL    Nucleated RBCs 0.0 0  WBC    nRBC 0.00 0.00 - 0.01 K/uL    Neutrophils % 80 (H) 32.0 - 75.0 %    Band Neutrophils 9 %    Lymphocytes % 6 (L) 12.0 - 49.0 %    Monocytes % 5 5.0 - 13.0 %    Eosinophils % 0 0.0 - 7.0 %    Basophils % 0 0.0 - 1.0 %    Immature Granulocytes % 0 0.0 - 0.5 %    Neutrophils Absolute 9.08 (H) 1.80 - 8.00 K/UL    Lymphocytes Absolute 0.61 (L) 0.80 - 3.50 K/UL    Monocytes Absolute 0.51 0.00 - 1.00 K/UL    Eosinophils Absolute 0.00 0.00 - 0.40 K/UL    Basophils Absolute 0.00 0.00 - 0.10 K/UL    Immature Granulocytes Absolute 0.00 0.00 - 0.04 K/UL    Differential Type MANUAL      RBC Comment NORMOCYTIC, NORMOCHROMIC     Comprehensive Metabolic Panel    Collection Time: 02/03/25  4:37 PM   Result Value Ref Range    Sodium 130 (L) 136 - 145 mmol/L    Potassium Hemolyzed, Recollection Recommended 3.5 - 5.1 mmol/L    Chloride 100 97 - 108 mmol/L    CO2 25 21 - 32 mmol/L    Anion Gap 5 2 - 12 mmol/L    Glucose 125 (H) 65 - 100 mg/dL    BUN 22 (H) 6 - 20 MG/DL    Creatinine 0.93 0.55 - 1.02 MG/DL    BUN/Creatinine Ratio 24 (H) 12 - 20      Est, Glom Filt Rate 70 >60 ml/min/1.73m2    Calcium 9.2 8.5 - 10.1 MG/DL    Total Bilirubin 0.4 0.2 - 1.0 MG/DL    ALT 38 12 - 78 U/L    AST Hemolyzed, Recollection Recommended 15 - 37 U/L    Alk Phosphatase 107 45 - 117 U/L    Total Protein 7.7 6.4 - 8.2 g/dL    Albumin 3.6 3.5 - 5.0 g/dL    Globulin 4.1 (H) 2.0 - 4.0 g/dL    Albumin/Globulin Ratio 0.9 (L) 1.1 - 2.2     Magnesium    Collection Time: 02/03/25  4:37 PM   Result Value Ref Range    Magnesium Hemolyzed, Recollection Recommended 1.6 - 2.4 mg/dL   Troponin “IF” patient is greater than 40 years of age or has a history of cardiac disease.    Collection Time: 02/03/25  4:37 PM   Result Value Ref Range    Troponin, High Sensitivity 7 0 - 51 ng/L   COVID-19 & Influenza Combo    Collection Time: 02/03/25  4:37 PM

## 2025-02-04 NOTE — ED NOTES
This author responded to patient's vital sign machine indicating oxygen level to be in the mid 80s. Upon entering the room patient found to be awake and reported \"that thing fell out of my nose\". Replaced nasal canula with flow rate of 2L, remained with patient and instructed patient to take deep breaths in through her nose and out through her mouth, pulse oximeter reading increased by one point after a minute, increased nasal canula to 3L, pulse oximeter reading increased by another one point after a minute, placed patient on 4L nasal canula patient oxygen reading above 90% reading 92%. Patient denies chest pain or shortness of breath, inspiratory wheezing noted. Updated nocturnist, included patient background as he is not primary MD but providing coverage at this time. Awaiting orders.

## 2025-02-04 NOTE — ED PROVIDER NOTES
[facility] EMERGENCY DEPARTMENT  EMERGENCY DEPARTMENT ENCOUNTER       Pt Name: Aarti Traylor  MRN: [id number]  Birthdate 1965  Date of evaluation: 2/3/2025  Provider: Keanu Dixon DO   PCP: Loren Rivas FNP  Note Started: 11:46 PM EST 2/3/25     CHIEF COMPLAINT       Chief Complaint   Patient presents with    Shortness of Breath     Pt report flu like symptoms starting Thursday, reports back pain, pain with breathing, aching, went today to [facility] and low O2 sats there, pt 87-90% on room air in triage        HISTORY OF PRESENT ILLNESS: 1 or more elements      History From: Patient, History limited by: none     Aarti Traylor is a 59 y.o. female presents to the emergency department for evaluation of shortness of breath.       Please See MDM for Additional Details of the HPI/PMH  Nursing Notes were all reviewed and agreed with or any disagreements were addressed in the HPI.     REVIEW OF SYSTEMS        Positives and Pertinent negatives as per HPI.    PAST HISTORY     Past Medical History:  Past Medical History:   Diagnosis Date    Cataract        Past Surgical History:  Past Surgical History:   Procedure Laterality Date    CATARACT REMOVAL Left 2019    CHOLECYSTECTOMY  1980s    HYSTERECTOMY (CERVIX STATUS UNKNOWN)      Dr. Pineda    KNEE ARTHROSCOPY Right     WISDOM TOOTH EXTRACTION         Family History:  Family History   Problem Relation Age of Onset    Cancer Father     No Known Problems Mother        Social History:  Social History     Tobacco Use    Smoking status: Former     Current packs/day: 0.00     Average packs/day: 0.5 packs/day for 10.0 years (5.0 ttl pk-yrs)     Types: Cigarettes     Start date: 2008     Quit date: 2018     Years since quittin.6     Passive exposure: Never    Smokeless tobacco: Never   Vaping Use    Vaping status: Never Used   Substance Use Topics    Alcohol use: Not Currently    Drug use: Never       Allergies:  No Known

## 2025-02-05 LAB
25(OH)D3 SERPL-MCNC: 35 NG/ML (ref 30–100)
ANION GAP SERPL CALC-SCNC: 4 MMOL/L (ref 2–12)
BASOPHILS # BLD: 0.02 K/UL (ref 0–0.1)
BASOPHILS NFR BLD: 0.2 % (ref 0–1)
BUN SERPL-MCNC: 9 MG/DL (ref 6–20)
BUN/CREAT SERPL: 20 (ref 12–20)
CALCIUM SERPL-MCNC: 8.5 MG/DL (ref 8.5–10.1)
CHLORIDE SERPL-SCNC: 106 MMOL/L (ref 97–108)
CO2 SERPL-SCNC: 27 MMOL/L (ref 21–32)
CREAT SERPL-MCNC: 0.44 MG/DL (ref 0.55–1.02)
DIFFERENTIAL METHOD BLD: NORMAL
EOSINOPHIL # BLD: 0.01 K/UL (ref 0–0.4)
EOSINOPHIL NFR BLD: 0.1 % (ref 0–7)
ERYTHROCYTE [DISTWIDTH] IN BLOOD BY AUTOMATED COUNT: 13.2 % (ref 11.5–14.5)
GLUCOSE SERPL-MCNC: 89 MG/DL (ref 65–100)
HCT VFR BLD AUTO: 36.4 % (ref 35–47)
HGB BLD-MCNC: 12.1 G/DL (ref 11.5–16)
IMM GRANULOCYTES # BLD AUTO: 0.03 K/UL (ref 0–0.04)
IMM GRANULOCYTES NFR BLD AUTO: 0.3 % (ref 0–0.5)
LYMPHOCYTES # BLD: 2.35 K/UL (ref 0.8–3.5)
LYMPHOCYTES NFR BLD: 23.3 % (ref 12–49)
MCH RBC QN AUTO: 30.8 PG (ref 26–34)
MCHC RBC AUTO-ENTMCNC: 33.2 G/DL (ref 30–36.5)
MCV RBC AUTO: 92.6 FL (ref 80–99)
MONOCYTES # BLD: 0.91 K/UL (ref 0–1)
MONOCYTES NFR BLD: 9 % (ref 5–13)
NEUTS SEG # BLD: 6.77 K/UL (ref 1.8–8)
NEUTS SEG NFR BLD: 67.1 % (ref 32–75)
NRBC # BLD: 0 K/UL (ref 0–0.01)
NRBC BLD-RTO: 0 PER 100 WBC
PLATELET # BLD AUTO: 176 K/UL (ref 150–400)
PMV BLD AUTO: 10.7 FL (ref 8.9–12.9)
POTASSIUM SERPL-SCNC: 3.7 MMOL/L (ref 3.5–5.1)
RBC # BLD AUTO: 3.93 M/UL (ref 3.8–5.2)
SODIUM SERPL-SCNC: 137 MMOL/L (ref 136–145)
WBC # BLD AUTO: 10.1 K/UL (ref 3.6–11)

## 2025-02-05 PROCEDURE — 6360000002 HC RX W HCPCS: Performed by: NURSE PRACTITIONER

## 2025-02-05 PROCEDURE — 2500000003 HC RX 250 WO HCPCS: Performed by: INTERNAL MEDICINE

## 2025-02-05 PROCEDURE — 85025 COMPLETE CBC W/AUTO DIFF WBC: CPT

## 2025-02-05 PROCEDURE — 94640 AIRWAY INHALATION TREATMENT: CPT

## 2025-02-05 PROCEDURE — 6370000000 HC RX 637 (ALT 250 FOR IP): Performed by: INTERNAL MEDICINE

## 2025-02-05 PROCEDURE — 1100000003 HC PRIVATE W/ TELEMETRY

## 2025-02-05 PROCEDURE — 36415 COLL VENOUS BLD VENIPUNCTURE: CPT

## 2025-02-05 PROCEDURE — 2500000003 HC RX 250 WO HCPCS: Performed by: NURSE PRACTITIONER

## 2025-02-05 PROCEDURE — 80048 BASIC METABOLIC PNL TOTAL CA: CPT

## 2025-02-05 PROCEDURE — 6360000002 HC RX W HCPCS: Performed by: INTERNAL MEDICINE

## 2025-02-05 RX ORDER — PREDNISONE 20 MG/1
40 TABLET ORAL DAILY
Status: DISCONTINUED | OUTPATIENT
Start: 2025-02-05 | End: 2025-02-06 | Stop reason: HOSPADM

## 2025-02-05 RX ORDER — IPRATROPIUM BROMIDE AND ALBUTEROL SULFATE 2.5; .5 MG/3ML; MG/3ML
1 SOLUTION RESPIRATORY (INHALATION) EVERY 6 HOURS PRN
Status: DISCONTINUED | OUTPATIENT
Start: 2025-02-05 | End: 2025-02-06 | Stop reason: HOSPADM

## 2025-02-05 RX ADMIN — SODIUM CHLORIDE, PRESERVATIVE FREE 10 ML: 5 INJECTION INTRAVENOUS at 20:29

## 2025-02-05 RX ADMIN — WATER 1000 MG: 1 INJECTION INTRAMUSCULAR; INTRAVENOUS; SUBCUTANEOUS at 17:50

## 2025-02-05 RX ADMIN — IPRATROPIUM BROMIDE AND ALBUTEROL SULFATE 1 DOSE: .5; 3 SOLUTION RESPIRATORY (INHALATION) at 08:15

## 2025-02-05 RX ADMIN — AZITHROMYCIN 500 MG: 250 TABLET, FILM COATED ORAL at 09:12

## 2025-02-05 RX ADMIN — GUAIFENESIN 600 MG: 600 TABLET ORAL at 20:29

## 2025-02-05 RX ADMIN — ENOXAPARIN SODIUM 30 MG: 100 INJECTION SUBCUTANEOUS at 09:13

## 2025-02-05 RX ADMIN — WATER 60 MG: 1 INJECTION INTRAMUSCULAR; INTRAVENOUS; SUBCUTANEOUS at 02:59

## 2025-02-05 RX ADMIN — GUAIFENESIN 600 MG: 600 TABLET ORAL at 09:13

## 2025-02-05 RX ADMIN — SODIUM CHLORIDE, PRESERVATIVE FREE 10 ML: 5 INJECTION INTRAVENOUS at 09:13

## 2025-02-05 RX ADMIN — PREDNISONE 40 MG: 20 TABLET ORAL at 09:24

## 2025-02-05 RX ADMIN — ARFORMOTEROL TARTRATE: 15 SOLUTION RESPIRATORY (INHALATION) at 08:15

## 2025-02-05 NOTE — PROGRESS NOTES
End of Shift Note    Bedside shift change report given to GISELLE Hernández (oncoming nurse) by Kayla Moon RN (offgoing nurse).  Report included the following information SBAR, Kardex, Intake/Output, MAR, Recent Results, and Cardiac Rhythm sinus rhythm    Shift worked:  7p-7a     Shift summary and any significant changes:     Patient tolerated care. Scheduled medications given. Pt did not complain of pain. Labs drawn.     Concerns for physician to address:  Pt had trouble sleeping and if she is here tonight would like something to help her sleep.      Zone phone for oncoming shift:          Activity:  Level of Assistance: Independent  Number times ambulated in hallways past shift: 0  Number of times OOB to chair past shift: 2    Cardiac:   Cardiac Monitoring: Yes      Cardiac Rhythm: Sinus rhythm    Access:  Current line(s): PIV     Genitourinary:        Respiratory:   O2 Device: None (Room air)  Chronic home O2 use?: NO  Incentive spirometer at bedside: NO    GI:  Last BM (including prior to admit): 02/04/25  Current diet:  ADULT DIET; Regular  ADULT ORAL NUTRITION SUPPLEMENT; Breakfast, Lunch, Dinner; Standard High Calorie/High Protein Oral Supplement  Passing flatus: YES    Pain Management:   Patient states pain is manageable on current regimen: YES    Skin:  Elvon Scale Score: 21  Interventions: Wound Offloading (Prevention Methods): Repositioning    Patient Safety:  Fall Risk:    Fall Risk Interventions  Toilet Every 2 Hours-In Advance of Need: Yes  Hourly Visual Checks: In bed, Awake  Fall Visual Posted: Socks  Room Door Open: Deferred to promote rest    Active Consults:   IP CONSULT TO PULMONOLOGY    Length of Stay:  Expected LOS: 3  Actual LOS: 2    Kayla Moon RN

## 2025-02-05 NOTE — PROGRESS NOTES
Hospitalist Progress Note    NAME:   Aarti Traylor   : 1965   MRN: [id number]     Date/Time: 2025 4:45 PM  Patient PCP: Loren Rivas FNP    Estimated discharge date: 24-48 hrs, home  Barriers: Pulm consult/clearance, Oxygen tapered off      Assessment / Plan:        SIRS POA- resolved  Flu syndrome POA-Cough, fever, congestion cold-like symptoms  Nausea vomiting diarrhea POA- resolved  Acute Resp failure with hypoxia POA- was not on oxygen at home till now  CXR :Hyperinflated lungs. Probable biapical emphysematous changes. No focal airspace process.  Influenza A/ B COVID-negative  CT chest: Prominent emphysema, small interstitial infiltrates bilaterally of unknown age, could be chronic  - Lactic 1.86  - pro jose 2.16  - proBNP 54  - UA neg  - initial Blood Cx neg x 24 hrs    Cont empiric Abx for now-Rocephin + azithromycin   Appreciate pulmonology recs  Continue to wean off oxygen  Home oxygen challenge       History of COPD but had not seen Lung doctor in years  -Patient states does not take albuterol or any other inhalers  Patient admits still  occasionally smoke  -Wean off oxygen as tolerated  Home oxygen challenge  Switch to oral steroids  Brovana and Pulmicort nebulization twice daily and as needed DuoNebs  PFT as outpatient        Medical Decision Making:   I personally reviewed labs: y  I personally reviewed imaging:y  I personally reviewed EKG:y   Toxic drug monitoring: y  Discussed case with: Patient RN IDR  Discussed with pulmonology  Monitor 1 more night and wean off oxygen     code Status: Full code  DVT Prophylaxis: Lovenox  Baseline: Lives independently with     Subjective:     Chief Complaint / Reason for Physician Visit:  Shortness of breath improving denies any chest pain      Objective:     VITALS:   Last 24hrs VS reviewed since prior progress note. Most recent are:  Patient Vitals for the past 24 hrs:   BP Temp Temp src Pulse Resp SpO2 Height   25 1510 107/70 97.7

## 2025-02-05 NOTE — PROGRESS NOTES
End of Shift Note    Bedside shift change report given to Kayla (oncoming nurse) by Betty Barajas RN (offgoing nurse).  Report included the following information SBAR, Intake/Output, MAR, and Recent Results    Shift worked:  7A-7P     Shift summary and any significant changes:     Pt AOX4, VSS. Pt weaned off O2, saturating >93% on RA. O2 challenge done, pt saturated 93-95% on RA ambulating. Denied SOB, distress and discomfort. Safety precaution maintained.      Concerns for physician to address:       Zone phone for oncoming shift:          Activity:  Level of Assistance: Independent  Number times ambulated in hallways past shift: 0  Number of times OOB to chair past shift: 2    Cardiac:   Cardiac Monitoring: Yes      Cardiac Rhythm: Sinus rhythm    Access:  Current line(s): PIV     Genitourinary:        Respiratory:   O2 Device: None (Room air)  Chronic home O2 use?: NO  Incentive spirometer at bedside: YES    GI:  Last BM (including prior to admit): 02/05/25  Current diet:  ADULT ORAL NUTRITION SUPPLEMENT; Breakfast, Lunch, Dinner; Standard High Calorie/High Protein Oral Supplement  ADULT ORAL NUTRITION SUPPLEMENT; Breakfast, Lunch; Clear Liquid Oral Supplement  ADULT ORAL NUTRITION SUPPLEMENT; Dinner; Frozen Oral Supplement  ADULT DIET; Regular; Chocolate supplements preferred  Passing flatus: YES    Pain Management:   Patient states pain is manageable on current regimen: YES    Skin:  Levon Scale Score: 20  Interventions: Wound Offloading (Prevention Methods): Repositioning    Patient Safety:  Fall Risk:    Fall Risk Interventions  Toilet Every 2 Hours-In Advance of Need: Yes  Hourly Visual Checks: Awake  Fall Visual Posted: Fall sign posted, Socks  Room Door Open: Yes  Alarm On: Bed  Patient Moved Closer to Nursing Station: No    Active Consults:   IP CONSULT TO PULMONOLOGY    Length of Stay:  Expected LOS: 3  Actual LOS: 2    Betty Barajas RN

## 2025-02-05 NOTE — PLAN OF CARE
Problem: Respiratory - Adult  Goal: Achieves optimal ventilation and oxygenation  Outcome: Progressing     Problem: Discharge Planning  Goal: Discharge to home or other facility with appropriate resources  Outcome: Progressing     Problem: Safety - Adult  Goal: Free from fall injury  Outcome: Progressing     Problem: Nutrition Deficit:  Goal: Optimize nutritional status  Outcome: Progressing

## 2025-02-05 NOTE — PROGRESS NOTES
Comprehensive Nutrition Assessment    Type and Reason for Visit:  Initial, Positive nutrition screen    Nutrition Recommendations/Plan:   Regular diet  Ensure plus BID, Ensure clear BID, Magic cup daily  Please document % meals and supplements consumed in flowsheet I/O's under intake   Monitor electrolytes and replete aggressively.      Malnutrition Assessment:  Malnutrition Status:  Severe malnutrition (02/05/25 6560)    Context:  Social/Environmental Circumstances     Findings of the 6 clinical characteristics of malnutrition:  Energy Intake:  50% or less estimated energy requirements for 1 month or longer  Weight Loss:  Unable to assess     Body Fat Loss:  Severe body fat loss Orbital, Triceps, Fat Overlying Ribs, Buccal region   Muscle Mass Loss:  Severe muscle mass loss Temples (temporalis), Clavicles (pectoralis & deltoids), Thigh (quadraceps), Calf (gastrocnemius), Hand (interosseous), Scapula (trapezius)  Fluid Accumulation:  No fluid accumulation     Strength:  Not Performed    Nutrition Assessment:     Chart reviewed for MST and low BMI. Pt medically noted for SIRS, N/V/D, acute hypoxic respiratory failure, COPD. Pt reports feeling poorly since Thursday last week (6 days ago). Usual weight used to be around 120lbs. Pt reports weight loss began about 2 years ago when she had her teeth pulled and dentures made. She also works nearly 12hr days at a [employer] job 5 days/wk and struggles to get in enough calories or protein. She is admittedly cachectic and desperate to gain weight. We discussed weight gain strategies r/t snacking, meals and supplements. Handouts provided. Trying more supplements while inpatient. Will continue monitoring.     Wt Readings from Last 5 Encounters:   02/03/25 40.3 kg (88 lb 13.5 oz)   02/03/25 40.6 kg (89 lb 9.6 oz)   06/25/24 43.4 kg (95 lb 9.6 oz)   02/19/24 44.9 kg (98 lb 15.8 oz)   07/20/23 44.9 kg (99 lb)   ]    Nutrition Related Findings:    Labs: reviewed.   Meds:  Rocephin, Prednisone.   BM 2/5 loose.   Wound Type: None       Current Nutrition Intake & Therapies:    Average Meal Intake: 26-50%  Average Supplements Intake: 1-25%, 26-50%  ADULT ORAL NUTRITION SUPPLEMENT; Breakfast, Lunch, Dinner; Standard High Calorie/High Protein Oral Supplement  ADULT ORAL NUTRITION SUPPLEMENT; Breakfast, Lunch; Clear Liquid Oral Supplement  ADULT ORAL NUTRITION SUPPLEMENT; Dinner; Frozen Oral Supplement  ADULT DIET; Regular; Chocolate supplements preferred    Anthropometric Measures:  Height: 172.7 cm (5' 8\")  Ideal Body Weight (IBW): 140 lbs (64 kg)       Current Body Weight: 40.3 kg (88 lb 13.5 oz), 63.5 % IBW. Weight Source: Not specified  Current BMI (kg/m2): 13.5           Weight Adjustment For: No Adjustment                 BMI Categories: Underweight (BMI less than 18.5)    Estimated Daily Nutrient Needs:  Energy Requirements Based On: Kcal/kg  Weight Used for Energy Requirements: Ideal  Energy (kcal/day): 1600 kcals (25 kcals/kg IBW)  Weight Used for Protein Requirements: Ideal  Protein (g/day): 51-64g (0.8-1.0g/kg IBW)  Method Used for Fluid Requirements: 1 ml/kcal  Fluid (ml/day): 1600mL    Nutrition Diagnosis:   Severe malnutrition, in context of social or environmental circumstances related to inadequate protein-energy intake as evidenced by criteria as identified in malnutrition assessment    Nutrition Interventions:   Food and/or Nutrient Delivery: Continue Current Diet, Continue Oral Nutrition Supplement  Nutrition Education/Counseling: Education/Counseling initiated  Coordination of Nutrition Care: Continue to monitor while inpatient       Goals:  Goals: PO intake 50% or greater, PO intake 75% or greater, by next RD assessment          Nutrition Monitoring and Evaluation:   Behavioral-Environmental Outcomes: None Identified  Food/Nutrient Intake Outcomes: Food and Nutrient Intake, Supplement Intake  Physical Signs/Symptoms Outcomes: Biochemical Data, GI Status, Nutrition

## 2025-02-05 NOTE — PROGRESS NOTES
Pulmonary Progress Note    Patient: Aarti Traylor                     YOB: 1965        Date- 2/5/2025                           Admit Date: 2/3/2025       CC: Follow up for hypoxia and COPD exacerbation    IMPRESSION & PLAN:     1.  Acute hypoxic respiratory failure from # 2&3.  2.  COPD exacerbation  3.  Sepsis with community-acquired pneumonia  4.  Chronic tobacco smoking  5.  Unintentional weight loss     Management plan :     Oxygenation much improved.  Resting sats in mid 90s on  trying off oxygen.    Continue current antibiotics, follow-up on sputum cultures  Switch steroids to oral prednisone.  Brovana and Pulmicort nebulizations twice daily and as needed DuoNebs.  5   Moderate to severe emphysema with no prior PFTs, recommend outpatient PFT after acute issues resolve.  6   Counseled on smoking cessation, patient seems motivated to quit.  7.   No findings on chest CT concerning for lung cancer, she will need workup for weight loss including age-appropriate cancer screening if not already done.    We will continue to follow with you    Total time on encounter > 35 min       Subjective:    Interval History:    Shortness of breath much better.    Cough improving, sputum cultures pending.    Diarrhea getting better.    Medications:  Current Facility-Administered Medications   Medication Dose Route Frequency Provider Last Rate Last Admin    ipratropium 0.5 mg-albuterol 2.5 mg (DUONEB) nebulizer solution 1 Dose  1 Dose Inhalation Q6H PRN Leigh Ann Chaudhary MD        predniSONE (DELTASONE) tablet 40 mg  40 mg Oral Daily Tram Calhoun MD        albuterol (PROVENTIL) (2.5 MG/3ML) 0.083% nebulizer solution 2.5 mg  2.5 mg Nebulization Q6H PRN Abdirahman Tobar MD        azithromycin (ZITHROMAX) tablet 500 mg  500 mg Oral Daily Oli Pittman MD   500 mg at 02/04/25 1145    guaiFENesin (MUCINEX) extended release tablet 600 mg  600 mg Oral BID Oli Pittman MD   600 mg at  Sandee CAMPBELL ACNP        arformoterol 15 mcg-budesonide 0.25 mg neb solution   Nebulization BID RT Sandee Stone ACNP   Given at 02/05/25 0815       I/O's:  No intake or output data in the 24 hours ending 02/05/25 0912           Vital Signs:  BP 94/61   Pulse 68   Temp 98.1 °F (36.7 °C)   Resp 18   Ht 1.727 m (5' 8\")   Wt 40.3 kg (88 lb 13.5 oz)   SpO2 91%   BMI 13.51 kg/m²  O2 Device: None (Room air)      Physical exam:     General : Middle-age female in bed looking comfortable.   HEENT :  Oral mucosa is moist.  Neck : Supple no JVD  Chest wall: Kyphotic,nontender  Lungs: Decreased breath sounds with prolonged expiration.  No crackles or wheezes  Heart : Regular with normal S1-S2  Abdomen: Soft and nontender  Extremities: Warm, no edema or clubbing.  Neuro: Alert, awake and follows commands    Lab/Diagnostic Studies:    Recent Labs     02/03/25  1637 02/04/25  0539 02/05/25  0315   WBC 10.2 11.1* 10.1   HGB 15.0 12.4 12.1   HCT 44.1 37.0 36.4    184 176     Recent Labs     02/03/25  1637 02/04/25  0539 02/05/25  0315   *  130* 136 137   K 5.5*  Hemolyzed, Recollection Recommended 4.0 3.7     100 108 106   CO2 24  25 22 27   BUN 23*  22* 13 9   CREATININE 0.97  0.93 0.49* 0.44*   MG Hemolyzed, Recollection Recommended  --   --    PHOS 3.2  --   --    ALT 38  --   --          Tram Thuy Calhoun MD  Pulmonology  [place], [place]  [phone]  2/5/2025

## 2025-02-05 NOTE — CARE COORDINATION
Care Management Initial Assessment       RUR: 6%  Readmission? No  1st IM letter given? No-[address] [employer]  1st  letter given: No    CM introduce self, explain role and confirmed demographics with pt.    Pt lives with her spouse in an one story home with four steps to enter.    No hx of home health, SNF or inpatient rehab.    Pt uses [facility] in [place].    At the time of d/c pt's family will transport.    CM will follow and assist with d/c planning.       02/05/25 8795   Service Assessment   Patient Orientation Alert and Oriented   Cognition Alert   History Provided By Patient   Primary Caregiver Self   Support Systems Spouse/Significant Other;Children   Patient's Healthcare Decision Maker is: Patient Declined (Legal Next of Kin Remains as Decision Maker)   PCP Verified by CM Yes   Last Visit to PCP   (Saw PCP last week)   Prior Functional Level Independent in ADLs/IADLs   Current Functional Level Independent in ADLs/IADLs   Can patient return to prior living arrangement Yes   Family able to assist with home care needs: Yes   Would you like for me to discuss the discharge plan with any other family members/significant others, and if so, who? No   Financial Resources Other (Comment)  ([place] BCBS)   Community Resources None   Social/Functional History   Lives With Spouse   Type of Home House   Home Equipment   (Shower chair)   Active  Yes   Discharge Planning   Type of Residence House   Current Services Prior To Admission None   Patient expects to be discharged to: House     Advance Care Planning     General Advance Care Planning (ACP) Conversation    Date of Conversation: 2/5/2025  Conducted with: Patient with Decision Making Capacity  Other persons present: None    Healthcare Decision Maker: No healthcare decision makers have been documented.       Content/Action Overview:  DECLINED ACP Conversation - will revisit periodically  Reviewed DNR/DNI and patient elects Full Code (Attempt  Resuscitation)        Length of Voluntary ACP Conversation in minutes:  <16 minutes (Non-Billable)    Shanta Gr

## 2025-02-06 VITALS
BODY MASS INDEX: 13.47 KG/M2 | HEART RATE: 66 BPM | WEIGHT: 88.85 LBS | TEMPERATURE: 97.3 F | SYSTOLIC BLOOD PRESSURE: 115 MMHG | DIASTOLIC BLOOD PRESSURE: 70 MMHG | RESPIRATION RATE: 18 BRPM | HEIGHT: 68 IN | OXYGEN SATURATION: 92 %

## 2025-02-06 LAB
ANION GAP SERPL CALC-SCNC: 4 MMOL/L (ref 2–12)
BASOPHILS # BLD: 0 K/UL (ref 0–0.1)
BASOPHILS NFR BLD: 0 % (ref 0–1)
BUN SERPL-MCNC: 15 MG/DL (ref 6–20)
BUN/CREAT SERPL: 29 (ref 12–20)
CALCIUM SERPL-MCNC: 8.9 MG/DL (ref 8.5–10.1)
CHLORIDE SERPL-SCNC: 107 MMOL/L (ref 97–108)
CO2 SERPL-SCNC: 28 MMOL/L (ref 21–32)
CREAT SERPL-MCNC: 0.52 MG/DL (ref 0.55–1.02)
DIFFERENTIAL METHOD BLD: NORMAL
EOSINOPHIL # BLD: 0 K/UL (ref 0–0.4)
EOSINOPHIL NFR BLD: 0 % (ref 0–7)
ERYTHROCYTE [DISTWIDTH] IN BLOOD BY AUTOMATED COUNT: 13.2 % (ref 11.5–14.5)
GLUCOSE SERPL-MCNC: 98 MG/DL (ref 65–100)
HCT VFR BLD AUTO: 38.3 % (ref 35–47)
HGB BLD-MCNC: 12.8 G/DL (ref 11.5–16)
IMM GRANULOCYTES # BLD AUTO: 0 K/UL (ref 0–0.04)
IMM GRANULOCYTES NFR BLD AUTO: 0 % (ref 0–0.5)
LYMPHOCYTES # BLD: 2.12 K/UL (ref 0.8–3.5)
LYMPHOCYTES NFR BLD: 29 % (ref 12–49)
MAGNESIUM SERPL-MCNC: 2 MG/DL (ref 1.6–2.4)
MCH RBC QN AUTO: 30.8 PG (ref 26–34)
MCHC RBC AUTO-ENTMCNC: 33.4 G/DL (ref 30–36.5)
MCV RBC AUTO: 92.1 FL (ref 80–99)
MONOCYTES # BLD: 0.51 K/UL (ref 0–1)
MONOCYTES NFR BLD: 7 % (ref 5–13)
NEUTS BAND NFR BLD MANUAL: 1 %
NEUTS SEG # BLD: 4.67 K/UL (ref 1.8–8)
NEUTS SEG NFR BLD: 63 % (ref 32–75)
NRBC # BLD: 0 K/UL (ref 0–0.01)
NRBC BLD-RTO: 0 PER 100 WBC
PHOSPHATE SERPL-MCNC: 2.9 MG/DL (ref 2.6–4.7)
PLATELET # BLD AUTO: 227 K/UL (ref 150–400)
PMV BLD AUTO: 11.3 FL (ref 8.9–12.9)
POTASSIUM SERPL-SCNC: 3.8 MMOL/L (ref 3.5–5.1)
RBC # BLD AUTO: 4.16 M/UL (ref 3.8–5.2)
RBC MORPH BLD: NORMAL
SODIUM SERPL-SCNC: 139 MMOL/L (ref 136–145)
WBC # BLD AUTO: 7.3 K/UL (ref 3.6–11)
WBC MORPH BLD: NORMAL

## 2025-02-06 PROCEDURE — 80048 BASIC METABOLIC PNL TOTAL CA: CPT

## 2025-02-06 PROCEDURE — 6370000000 HC RX 637 (ALT 250 FOR IP): Performed by: INTERNAL MEDICINE

## 2025-02-06 PROCEDURE — 6360000002 HC RX W HCPCS: Performed by: NURSE PRACTITIONER

## 2025-02-06 PROCEDURE — 83735 ASSAY OF MAGNESIUM: CPT

## 2025-02-06 PROCEDURE — 2500000003 HC RX 250 WO HCPCS: Performed by: NURSE PRACTITIONER

## 2025-02-06 PROCEDURE — 84100 ASSAY OF PHOSPHORUS: CPT

## 2025-02-06 PROCEDURE — 85025 COMPLETE CBC W/AUTO DIFF WBC: CPT

## 2025-02-06 PROCEDURE — 36415 COLL VENOUS BLD VENIPUNCTURE: CPT

## 2025-02-06 PROCEDURE — 94640 AIRWAY INHALATION TREATMENT: CPT

## 2025-02-06 RX ORDER — PREDNISONE 20 MG/1
40 TABLET ORAL DAILY
Qty: 8 TABLET | Refills: 0 | Status: SHIPPED | OUTPATIENT
Start: 2025-02-07 | End: 2025-02-11

## 2025-02-06 RX ORDER — TIOTROPIUM BROMIDE 18 UG/1
18 CAPSULE ORAL; RESPIRATORY (INHALATION) DAILY
Qty: 90 CAPSULE | Refills: 0 | Status: SHIPPED | OUTPATIENT
Start: 2025-02-06

## 2025-02-06 RX ORDER — AZITHROMYCIN 500 MG/1
500 TABLET, FILM COATED ORAL DAILY
Qty: 2 TABLET | Refills: 0 | Status: SHIPPED | OUTPATIENT
Start: 2025-02-06 | End: 2025-02-08

## 2025-02-06 RX ORDER — ALBUTEROL SULFATE 90 UG/1
2 INHALANT RESPIRATORY (INHALATION) EVERY 6 HOURS PRN
Qty: 18 G | Refills: 0 | Status: SHIPPED | OUTPATIENT
Start: 2025-02-06

## 2025-02-06 RX ORDER — AZITHROMYCIN 250 MG/1
500 TABLET, FILM COATED ORAL ONCE
Status: COMPLETED | OUTPATIENT
Start: 2025-02-06 | End: 2025-02-06

## 2025-02-06 RX ADMIN — AZITHROMYCIN 500 MG: 250 TABLET, FILM COATED ORAL at 12:08

## 2025-02-06 RX ADMIN — ARFORMOTEROL TARTRATE: 15 SOLUTION RESPIRATORY (INHALATION) at 08:26

## 2025-02-06 RX ADMIN — ENOXAPARIN SODIUM 30 MG: 100 INJECTION SUBCUTANEOUS at 09:10

## 2025-02-06 RX ADMIN — SODIUM CHLORIDE, PRESERVATIVE FREE 10 ML: 5 INJECTION INTRAVENOUS at 09:12

## 2025-02-06 RX ADMIN — PREDNISONE 40 MG: 20 TABLET ORAL at 09:10

## 2025-02-06 RX ADMIN — GUAIFENESIN 600 MG: 600 TABLET ORAL at 09:10

## 2025-02-06 NOTE — DISCHARGE SUMMARY
Discharge Summary    Name: Aarti Traylor  [id number]  YOB: 1965 (Age: 59 y.o.)   Date of Admission: 2/3/2025  Date of Discharge: 2/6/2025  Attending Physician: Leigh Ann Chaudhary MD    Discharge Diagnosis:   SIRS  Flulike symptoms  COPD exacerbation  Acute hypoxic respiratory failure  Community-acquired pneumonia  Unintentional weight loss needs to be worked up as outpatient  Consultations:  IP CONSULT TO PULMONOLOGY      Brief Admission History/Reason for Admission Per Naren Gross MD:   Aarti Traylor is a 59 y.o.  female with PMHx significant for COPD, presents to the ED  by urgency of her PCP, noted hypoxia during office visit, oxygen 84 to 86% placed on 4 L nasal cannula oxygenation improved to 91 to 92% .  Patient states she has not been feeling well for the past 4 days  poor appetite, fever and chills with diarrhea.  She denies being in the company of anyone with influenza A/B,or COVID.  While in office rapid influenza and COVID test obtained both negative.  Pt states she delayed coming to the ED due work consequences.  Patient notes her  is also hospitalized due to similar symptoms.     Brief Hospital Course by Main Problems:   SIRS POA- resolved  Flu syndrome POA-Cough, fever, congestion cold-like symptoms  Nausea vomiting diarrhea POA- resolved  Acute Resp failure with hypoxia   COPD history exacerbation  Community-acquired pneumonia    Chest x-ray hyperinflated lungs probable biapical emphysematous changes  CT chest: Prominent emphysema, small interstitial infiltrates bilaterally of unknown age, could be chronic  - Lactic 1.86  - pro jose 2.16  - proBNP 54  - UA neg  - initial Blood Cx neg x 24 hrs    Patient was empirically treated with ceftriaxone and azithromycin  Discussed with pulmonology will be discharging on Spiriva, azithromycin 2 more days, prednisone for more days and albuterol rescue inhaler  Patient weaned off oxygen and no requirement of

## 2025-02-06 NOTE — CARE COORDINATION
Pt is clear from CM standpoint for d/c.    Pt's son will transport.    Transition of Care Plan:    RUR: 6%  Prior Level of Functioning: Independent   Disposition: Home with spouse   SOHEILA: 2/6/25  If SNF or IPR: Date FOC offered:   Date FOC received:   Accepting facility:   Date authorization started with reference number:   Date authorization received and expires:   Follow up appointments: PCP   DME needed: No DME needed  Transportation at discharge: Pt's family   IM/IMM Medicare/ letter given: N/A [place]  Is patient a [place] and connected with VA? No   If yes, was  transfer form completed and VA notified? No  Caregiver Contact: pt's spouse   Discharge Caregiver contacted prior to discharge? Pt was contacted   Care Conference needed? No  Barriers to discharge: None          02/06/25 1155   Services At/After Discharge   Transition of Care Consult (CM Consult) N/A   Services At/After Discharge None   [place] Resource Information Provided? No   Mode of Transport at Discharge Self   Confirm Follow Up Transport Self   Condition of Participation: Discharge Planning   The Plan for Transition of Care is related to the following treatment goals: Goal is to return home with follow up appointments   The Patient and/or Patient Representative was provided with a Choice of Provider? Patient   The Patient and/Or Patient Representative agree with the Discharge Plan? Yes   Freedom of Choice list was provided with basic dialogue that supports the patient's individualized plan of care/goals, treatment preferences, and shares the quality data associated with the providers?  Yes     Shanta Gr

## 2025-02-06 NOTE — PROGRESS NOTES
Pt AOX4, VSS. Pt remain on RA, saturating >93%. Pt denied SOB and distress. Pt has to discharge home. Discharge instructions given, pt verbalized understanding. IV removed, catheter intact. No redness or swelling noted all belongings sent home with pt. Pt accompanied home by son.

## 2025-02-06 NOTE — PROGRESS NOTES
Pulmonary Progress Note    Patient: Aarti Traylor                     YOB: 1965        Date- 2/6/2025                           Admit Date: 2/3/2025       CC: Follow up for hypoxia and COPD exacerbation    IMPRESSION & PLAN:     1.  Acute hypoxic respiratory failure from # 2&3.-Improved  2.  COPD exacerbation-improved  3.  Sepsis with community-acquired pneumonia-improved  4.  Chronic tobacco smoking  5.  Unintentional weight loss     Management plan :     Hypoxia much improved.  Resting sats in 90s on room air.  Continue azithromycin to complete the course.  Continue steroid course.  Discontinue Brovana and Pulmicort nebulizations.  Start Spiriva daily at discharge  5   Moderate to severe emphysema with no prior PFTs, recommend outpatient PFT after acute issues resolve.  6   recommended absolute smoking cessation, patient seems motivated to quit.  7.  No findings on current chest CT concerning for lung cancer, will benefit from annual lung cancer screening    Thank you for involving us in her care.  We will sign off and offer follow-up with my partners at [facility]    Total time on encounter > 35 min       Subjective:    Interval History:    Cough and shortness of breath much better.    Diarrhea improved as well, stools are soft.  Eating okay, no nausea  Medications:  Current Facility-Administered Medications   Medication Dose Route Frequency Provider Last Rate Last Admin    ipratropium 0.5 mg-albuterol 2.5 mg (DUONEB) nebulizer solution 1 Dose  1 Dose Inhalation Q6H PRN Leigh Ann Chaudhary MD        predniSONE (DELTASONE) tablet 40 mg  40 mg Oral Daily Tram Calhoun MD   40 mg at 02/05/25 0924    albuterol (PROVENTIL) (2.5 MG/3ML) 0.083% nebulizer solution 2.5 mg  2.5 mg Nebulization Q6H PRN Abdirahman Tobar MD        guaiFENesin (MUCINEX) extended release tablet 600 mg  600 mg Oral BID Oli Pittman MD   600 mg at 02/05/25 2029    acetaminophen (TYLENOL) tablet 650 mg   650 mg Oral Q4H PRN Keanu Dixon DO   650 mg at 02/04/25 0446    sodium chloride flush 0.9 % injection 5-40 mL  5-40 mL IntraVENous 2 times per day Sandee Stone ACNP   10 mL at 02/05/25 2029    sodium chloride flush 0.9 % injection 5-40 mL  5-40 mL IntraVENous PRN Sandee Stone ACNP        0.9 % sodium chloride infusion   IntraVENous PRN Sandee Stone ACNP        potassium chloride (KLOR-CON M) extended release tablet 40 mEq  40 mEq Oral PRN Sandee Stone ACNP        Or    potassium bicarb-citric acid (EFFER-K) effervescent tablet 40 mEq  40 mEq Oral PRN Sandee Stone ACNP        Or    potassium chloride 10 mEq/100 mL IVPB (Peripheral Line)  10 mEq IntraVENous PRN Sandee Stone ACNP        magnesium sulfate 2000 mg in 50 mL IVPB premix  2,000 mg IntraVENous PRN Sandee Stone ACNP        enoxaparin Sodium (LOVENOX) injection 30 mg  30 mg SubCUTAneous Daily Sandee Stone ACNP   30 mg at 02/05/25 0913    ondansetron (ZOFRAN-ODT) disintegrating tablet 4 mg  4 mg Oral Q8H PRN Sandee Stone ACNP        Or    ondansetron (ZOFRAN) injection 4 mg  4 mg IntraVENous Q6H PRN Sandee Stone ACNP        polyethylene glycol (GLYCOLAX) packet 17 g  17 g Oral Daily PRN Sandee Stone ACNP        acetaminophen (TYLENOL) tablet 650 mg  650 mg Oral Q6H PRN Sandee Stone ACNP        Or    acetaminophen (TYLENOL) suppository 650 mg  650 mg Rectal Q6H PRN Sandee Stone ACNP        cefTRIAXone (ROCEPHIN) 1,000 mg in sterile water 10 mL IV syringe  1,000 mg IntraVENous Q24H Sandee Stone ACNP   1,000 mg at 02/05/25 1750    melatonin tablet 6 mg  6 mg Oral Nightly PRN Sandee Stone ACNP        calcium carbonate (TUMS) chewable tablet 500 mg  500 mg Oral TID PRN Sandee Stone ACNP   500 mg at 02/03/25 2017    hydrOXYzine HCl (ATARAX) tablet 25 mg  25 mg Oral Q6H PRN Sandee Stone ACNP        traMADol (ULTRAM) tablet 50 mg  50 mg Oral Q6H PRN Sandee Stone ACNP        arformoterol 15 mcg-budesonide 0.25  mg neb solution   Nebulization BID RT Sandee Stone ACNP   Given at 02/06/25 0826       I/O's:  No intake or output data in the 24 hours ending 02/06/25 0858           Vital Signs:  /70   Pulse 60   Temp 97.3 °F (36.3 °C) (Oral)   Resp 18   Ht 1.727 m (5' 8\")   Wt 40.3 kg (88 lb 13.5 oz)   SpO2 93%   BMI 13.51 kg/m²  O2 Device: None (Room air)      Physical exam:     General : Middle-age female in bed looking comfortable.   HEENT :  Oral mucosa is moist.  Neck : Supple no JVD  Chest wall: Kyphotic,nontender  Lungs: Decreased breath sounds with prolonged expiration.   Heart : Regular with normal S1-S2  Abdomen: Soft and nontender  Extremities: Warm, no edema or clubbing.  Neuro: Alert, awake and follows commands    Lab/Diagnostic Studies:    Recent Labs     02/04/25  0539 02/05/25  0315 02/06/25  0236   WBC 11.1* 10.1 7.3   HGB 12.4 12.1 12.8   HCT 37.0 36.4 38.3    176 227     Recent Labs     02/03/25  1637 02/04/25  0539 02/05/25  0315 02/06/25  0236   *  130* 136 137 139   K 5.5*  Hemolyzed, Recollection Recommended 4.0 3.7 3.8     100 108 106 107   CO2 24  25 22 27 28   BUN 23*  22* 13 9 15   CREATININE 0.97  0.93 0.49* 0.44* 0.52*   MG Hemolyzed, Recollection Recommended  --   --  2.0   PHOS 3.2  --   --  2.9   ALT 38  --   --   --          Tram Calhoun MD  Pulmonology  [place], [place]  [phone]  2/6/2025

## 2025-02-06 NOTE — PROGRESS NOTES
End of Shift Note    Bedside shift change report given to GISELLE Hernández (oncoming nurse) by Kayla Moon RN (offgoing nurse).  Report included the following information SBAR, Kardex, Intake/Output, MAR, Recent Results, and Cardiac Rhythm sinus Rhythm    Shift worked:  7p-7a     Shift summary and any significant changes:     Patient tolerated care. VSS. Patient O2 continues to be stable. Pt denies SOB and pain. Caring rounds. Pt was able to rest some over shift. Labs drawn.     Concerns for physician to address:  none     Zone phone for oncoming shift:          Activity:  Level of Assistance: Independent  Number times ambulated in hallways past shift: 0  Number of times OOB to chair past shift: 0    Cardiac:   Cardiac Monitoring: Yes      Cardiac Rhythm: Sinus rhythm    Access:  Current line(s): PIV     Genitourinary:        Respiratory:   O2 Device: None (Room air)  Chronic home O2 use?: NO  Incentive spirometer at bedside: YES    GI:  Last BM (including prior to admit): 02/05/25  Current diet:  ADULT ORAL NUTRITION SUPPLEMENT; Breakfast, Lunch, Dinner; Standard High Calorie/High Protein Oral Supplement  ADULT ORAL NUTRITION SUPPLEMENT; Breakfast, Lunch; Clear Liquid Oral Supplement  ADULT ORAL NUTRITION SUPPLEMENT; Dinner; Frozen Oral Supplement  ADULT DIET; Regular; Chocolate supplements preferred  Passing flatus: YES    Pain Management:   Patient states pain is manageable on current regimen: YES    Skin:  Levon Scale Score: 20  Interventions: Wound Offloading (Prevention Methods): Repositioning    Patient Safety:  Fall Risk:    Fall Risk Interventions  Toilet Every 2 Hours-In Advance of Need: Yes  Hourly Visual Checks: Awake  Fall Visual Posted: Fall sign posted, Socks  Room Door Open: Yes  Alarm On: Bed  Patient Moved Closer to Nursing Station: No    Active Consults:   IP CONSULT TO PULMONOLOGY    Length of Stay:  Expected LOS: 3  Actual LOS: 3    Kayla Moon RN

## 2025-02-06 NOTE — PLAN OF CARE
Problem: Respiratory - Adult  Goal: Achieves optimal ventilation and oxygenation  2/6/2025 1112 by Betty Barajas RN  Outcome: Progressing  2/6/2025 1022 by Lakshmi Pizarro, RT  Outcome: Progressing     Problem: Discharge Planning  Goal: Discharge to home or other facility with appropriate resources  Outcome: Progressing     Problem: Safety - Adult  Goal: Free from fall injury  Outcome: Progressing     Problem: Nutrition Deficit:  Goal: Optimize nutritional status  Outcome: Progressing

## 2025-02-06 NOTE — PROGRESS NOTES
2/6/2025        RE: Aarti Traylor         [address] [place] [facility] [place] [address]          To Whom It May Concern,      Due to medical reasons, Aarti Traylor was admitted at [facility] from 2/3/2025 to 2/6/25.  She may return to work on 2/17/2025        Sincerely,          Leigh Ann Chaudhary MD    Home

## 2025-02-07 ENCOUNTER — CLINICAL DOCUMENTATION (OUTPATIENT)
Age: 60
End: 2025-02-07

## 2025-02-09 LAB
BACTERIA SPEC CULT: NORMAL
BACTERIA SPEC CULT: NORMAL
SERVICE CMNT-IMP: NORMAL
SERVICE CMNT-IMP: NORMAL

## 2025-02-13 ENCOUNTER — OFFICE VISIT (OUTPATIENT)
Age: 60
End: 2025-02-13

## 2025-02-13 VITALS
HEART RATE: 75 BPM | RESPIRATION RATE: 19 BRPM | SYSTOLIC BLOOD PRESSURE: 109 MMHG | HEIGHT: 68 IN | OXYGEN SATURATION: 93 % | TEMPERATURE: 97 F | BODY MASS INDEX: 14.12 KG/M2 | WEIGHT: 93.2 LBS | DIASTOLIC BLOOD PRESSURE: 75 MMHG

## 2025-02-13 DIAGNOSIS — B37.0 ORAL THRUSH: ICD-10-CM

## 2025-02-13 DIAGNOSIS — Z09 HOSPITAL DISCHARGE FOLLOW-UP: Primary | ICD-10-CM

## 2025-02-13 DIAGNOSIS — R53.1 WEAKNESS GENERALIZED: ICD-10-CM

## 2025-02-13 PROBLEM — R09.02 HYPOXIA: Status: RESOLVED | Noted: 2025-02-03 | Resolved: 2025-02-13

## 2025-02-13 RX ORDER — NYSTATIN 100000 [USP'U]/ML
500000 SUSPENSION ORAL 4 TIMES DAILY
Qty: 200 ML | Refills: 0 | Status: SHIPPED | OUTPATIENT
Start: 2025-02-13 | End: 2025-02-23

## 2025-02-13 NOTE — PROGRESS NOTES
Post-Discharge Transitional Care  Follow Up      Aarti Traylor   YOB: 1965    Date of Office Visit:  2/13/2025  Date of Hospital Admission: 2/3/25  Date of Hospital Discharge: 2/6/25  Risk of hospital readmission (high >=14%. Medium >=10%) :Readmission Risk Score: 6      Care management risk score Rising risk (score 2-5) and Complex Care (Scores >=6): No Risk Score On File     Non face to face  following discharge, date last encounter closed (first attempt may have been earlier): 02/07/2025    Call initiated 2 business days of discharge: Yes    ASSESSMENT/PLAN:   Hospital discharge follow-up  Comments:  Follow-up with pulmonology scheduled for April 2025. Doing well on inhalers.  Orders:  -     NC DISCHARGE MEDS RECONCILED W/ CURRENT OUTPATIENT MED LIST  Weakness generalized  Comments:  Secondary to recent hospitalization. 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  Oral thrush  Comments:  Related to inhaler use. Pt now rinses mouth after taking out dentures, which she was not doing previously.  Orders:  -     nystatin (MYCOSTATIN) 090097 UNIT/ML suspension; Take 5 mLs by mouth 4 times daily for 10 days Retain in mouth as long as possible, Oral, 4 TIMES DAILY Starting Thu 2/13/2025, Until Sun 2/23/2025, For 10 days, Disp-200 mL, R-0, Normal      Medical Decision Making: moderate complexity  Return if symptoms worsen or fail to improve.    On this date 2/13/2025 I have spent 30 minutes reviewing previous notes, test results and face to face with the patient discussing the diagnosis and importance of compliance with the treatment plan as well as documenting on the day of the visit.       Subjective:   HPI:  Follow up of Hospital problems/diagnosis(es): COPD exacerbation, hypoxia, pneumonia    Inpatient course: Discharge summary reviewed- see chart.    Interval history/Current status: Stable; able to be weaned from oxygen during hospitalization.    She was hospitalized

## 2025-02-13 NOTE — PATIENT INSTRUCTIONS
Reach out to [employer] and tell them to fax over new disability forms for me to sign. We will keep you out of work until 2/24/25.

## 2025-02-13 NOTE — PROGRESS NOTES
The patient identity was confirmed with  and First/Last Name. Medications and Allergies reviewed with patient, as well as any new diagnosis/procedures.    Chief Complaint   Patient presents with    Follow-Up from Hospital        Vitals:    25 0949   BP: 109/75   Pulse: 75   Resp: 19   Temp: 97 °F (36.1 °C)   SpO2: 93%       Health Maintenance Due   Topic Date Due    HIV screen  Never done    DTaP/Tdap/Td vaccine (1 - Tdap) Never done    Colorectal Cancer Screen  Never done    Shingles vaccine (1 of 2) Never done    Pneumococcal 50+ years Vaccine (1 of 1 - PCV) Never done    Respiratory Syncytial Virus (RSV) Pregnant or age 60 yrs+ (1 - Risk 60-74 years 1-dose series) Never done    Flu vaccine (1) Never done    COVID-19 Vaccine ( -  season) Never done          \"Have you been to the ER, urgent care clinic since your last visit?  Hospitalized since your last visit?\"    Yes- Same day as appointment here last time. Was sent on our recommendation.     “Have you seen or consulted any other health care providers outside our system since your last visit?”    NO      “Have you had a colorectal cancer screening such as a colonoscopy/FIT/Cologuard?    NO    No colonoscopy on file  No cologuard on file  No FIT/FOBT on file   No flexible sigmoidoscopy on file            oral

## 2025-02-20 NOTE — PROGRESS NOTES
Physician Progress Note      PATIENT:               FRANK DUNNE  [facility] #:                  [id number]  :                       1965  ADMIT DATE:       2/3/2025 4:05 PM  DISCH DATE:        2025 12:42 PM  RESPONDING  PROVIDER #:        Leigh Ann Chaudhary MD          QUERY TEXT:    Patient admitted 2/3 to  with pneumonia. Noted documentation of SIRS in DS   with sepsis in  and  pulm Dr Calhoun note. In order to support the   diagnosis of sepsis, please include additional clinical indicators in your   documentation.  Or please document if the diagnosis of sepsis has been ruled   out after further study    The medical record reflects the following:  Risk Factors: Pneumonia  Clinical Indicators: WBC range 7.3 - 11.1, Temp max of 100.2  Treatment: 2L NS bolus, IV Rocephin and Zithromax  Options provided:  -- Sepsis was ruled out after study  -- Sepsis present as evidenced by, Please document evidence.  -- Other - I will add my own diagnosis  -- Disagree - Not applicable / Not valid  -- Disagree - Clinically unable to determine / Unknown  -- Refer to Clinical Documentation Reviewer    PROVIDER RESPONSE TEXT:    sirs    Query created by: CHRISTIE STERN on 2/10/2025 8:21 AM      QUERY TEXT:    Patient admitted with Pneumonia. Noted to have Severe malnutrition on    dietitian note. Please document in progress notes and discharge summary if you   are evaluating and /or treating any of the following:    The medical record reflects the following:  Risk Factors: 59 year old  with COPD presented with nausea, vomiting, and   diarrhea, admitted with pneumonia    Clinical Indicators:   ELVA Tidwell Dietitian Malnutrition Assessment:  Malnutrition Status:  Severe malnutrition (25 0637)  Context:  Social/Environmental Circumstances  Findings of the 6 clinical characteristics of malnutrition:  Energy Intake:  50% or less estimated energy requirements for 1 month or   longer  Weight Loss:  Unable to assess  Body

## 2025-05-02 ENCOUNTER — COMMUNITY OUTREACH (OUTPATIENT)
Age: 60
End: 2025-05-02

## 2025-05-15 ENCOUNTER — TELEPHONE (OUTPATIENT)
Facility: CLINIC | Age: 60
End: 2025-05-15

## 2025-05-15 NOTE — TELEPHONE ENCOUNTER
The last visit we had was on 2/13/25 for her hospital follow-up and we discussed short-term disability until 2/24/25. That was faxed on 2/26/25.    Usually companies like [employer] have a form they need filled out to be faxed. If she is requesting I fill out paperwork for short-term disability, she can either bring the paperwork with her to the [place] office to drop off or she can fax it.     She can also have [employer] fax over the documents directly to the office as she did previously.    Thanks,  Loren Rivas, FNP

## 2025-05-15 NOTE — TELEPHONE ENCOUNTER
I called patient, she will have them fax it to [facility] location for us to fill.

## 2025-05-15 NOTE — TELEPHONE ENCOUNTER
Patient would like for NP Evelyn to call so she can give you information to send to [employer] about working less hours. She states you discussed this at her previous appt. Please call.    [phone]

## 2025-05-20 ENCOUNTER — TELEPHONE (OUTPATIENT)
Facility: CLINIC | Age: 60
End: 2025-05-20

## 2025-05-20 NOTE — TELEPHONE ENCOUNTER
I called patient back to get more information:    Medication Questions: States in Feb she got a 3 month supply of tiotropium (SPIRIVA HANDIHALER) 18 MCG inhalation capsule. Patient isn't sure if she should still be taking it now that she is out, and if so she will need a refill.     Work Questions: Patient states she is having issues through work, she is going to work with [employer] to get down to 7a-3:30p just her 8 hours. She is calling them to send us the fax information like previously.    Patient states when we call her back we can leave detailed voicemail as she cannot answer while at work.

## 2025-05-20 NOTE — TELEPHONE ENCOUNTER
Patient states she would like the paperwork for her job to say she can work no more then 40 hours a week and would like a call back concerning her medication

## 2025-05-21 NOTE — TELEPHONE ENCOUNTER
LVM detailed per patient request with the following:    She needs to continue to use the spiriva inhaler for her COPD. She should be seeing pulmonology for follow up on that.     I will fill out the paperwork as she is requesting once we receive it from [employer].     Thanks,  Loren Rivas, FIDELIAP

## 2025-05-21 NOTE — TELEPHONE ENCOUNTER
She needs to continue to use the spiriva inhaler for her COPD. She should be seeing pulmonology for follow up on that.    I will fill out the paperwork as she is requesting once we receive it from [employer].    Thanks,  Loren Rivas, FIDELIAP

## 2025-08-27 ENCOUNTER — OFFICE VISIT (OUTPATIENT)
Age: 60
End: 2025-08-27
Payer: COMMERCIAL

## 2025-08-27 VITALS
DIASTOLIC BLOOD PRESSURE: 86 MMHG | HEART RATE: 78 BPM | OXYGEN SATURATION: 94 % | SYSTOLIC BLOOD PRESSURE: 125 MMHG | BODY MASS INDEX: 14.9 KG/M2 | TEMPERATURE: 98.1 F | WEIGHT: 98 LBS

## 2025-08-27 DIAGNOSIS — J32.9 RHINOSINUSITIS: Primary | ICD-10-CM

## 2025-08-27 PROCEDURE — 99213 OFFICE O/P EST LOW 20 MIN: CPT

## 2025-08-27 RX ORDER — NICOTINE 21 MG/24HR
PATCH, TRANSDERMAL 24 HOURS TRANSDERMAL
COMMUNITY

## (undated) DEVICE — SYR 3ML LL TIP 1/10ML GRAD --

## (undated) DEVICE — SYR 10ML LUER LOK 1/5ML GRAD --

## (undated) DEVICE — HIGH FLOW RATE EXTENSION SET, LUER LOCK ADAPTERS

## (undated) DEVICE — SYRINGE INSULIN 1ML LUERSLIP TIP W/O SFTY U100 BLISTER PK

## (undated) DEVICE — STERILE POLYISOPRENE POWDER-FREE SURGICAL GLOVES: Brand: PROTEXIS

## (undated) DEVICE — THE MONARCH® "D" CARTRIDGE IS A SINGLE-USE POLYPROPYLENE CARTRIDGE FOR POSTERIOR CHAMBER IOL DELIVERY: Brand: MONARCH® III

## (undated) DEVICE — SOLUTION IV STRL H2O 500 ML AQUALITE POUR BTL

## (undated) DEVICE — Device

## (undated) DEVICE — CHANG HYDRO-DISSECT CANNULA 27GA: Brand: OPHTHALMIC CANNULA

## (undated) DEVICE — 3M™ TEGADERM™ TRANSPARENT FILM DRESSING FRAME STYLE, 1624W, 2-3/8 IN X 2-3/4 IN (6 CM X 7 CM), 100/CT 4CT/CASE: Brand: 3M™ TEGADERM™

## (undated) DEVICE — TOWEL SURG W17XL27IN STD BLU COT NONFENESTRATED PREWASHED

## (undated) DEVICE — KENDALL DL ECG CABLE AND LEAD WIRE SYSTEM, 3-LEAD, SINGLE PATIENT USE: Brand: KENDALL

## (undated) DEVICE — NDL FLTR TIP 5 MIC 18GX1.5IN --

## (undated) DEVICE — SURGICAL PROCEDURE PACK CATRCT CUST

## (undated) DEVICE — SOLUTION IV 250ML 0.9% SOD CHL CLR INJ FLX BG CONT PRT CLSR

## (undated) DEVICE — SYR 5ML 1/5 GRAD LL NSAF LF --

## (undated) DEVICE — KENDALL DL ECG CABLE AND LEAD WIRE SYSTEM, 5-LEAD, SINGLE PATIENT USE: Brand: KENDALL